# Patient Record
Sex: MALE | Race: WHITE
[De-identification: names, ages, dates, MRNs, and addresses within clinical notes are randomized per-mention and may not be internally consistent; named-entity substitution may affect disease eponyms.]

---

## 2018-01-11 ENCOUNTER — HOSPITAL ENCOUNTER (INPATIENT)
Dept: HOSPITAL 17 - NEPC | Age: 78
LOS: 2 days | Discharge: HOME | DRG: 378 | End: 2018-01-13
Attending: HOSPITALIST | Admitting: HOSPITALIST
Payer: COMMERCIAL

## 2018-01-11 VITALS
RESPIRATION RATE: 18 BRPM | OXYGEN SATURATION: 100 % | DIASTOLIC BLOOD PRESSURE: 69 MMHG | SYSTOLIC BLOOD PRESSURE: 146 MMHG | TEMPERATURE: 97.5 F | HEART RATE: 76 BPM

## 2018-01-11 VITALS
HEART RATE: 102 BPM | SYSTOLIC BLOOD PRESSURE: 160 MMHG | RESPIRATION RATE: 20 BRPM | OXYGEN SATURATION: 100 % | TEMPERATURE: 98 F | DIASTOLIC BLOOD PRESSURE: 64 MMHG

## 2018-01-11 VITALS
OXYGEN SATURATION: 99 % | HEART RATE: 77 BPM | TEMPERATURE: 98 F | RESPIRATION RATE: 20 BRPM | DIASTOLIC BLOOD PRESSURE: 57 MMHG | SYSTOLIC BLOOD PRESSURE: 116 MMHG

## 2018-01-11 VITALS
HEART RATE: 72 BPM | OXYGEN SATURATION: 100 % | DIASTOLIC BLOOD PRESSURE: 65 MMHG | RESPIRATION RATE: 18 BRPM | SYSTOLIC BLOOD PRESSURE: 133 MMHG

## 2018-01-11 VITALS
HEART RATE: 76 BPM | SYSTOLIC BLOOD PRESSURE: 125 MMHG | RESPIRATION RATE: 20 BRPM | DIASTOLIC BLOOD PRESSURE: 60 MMHG | OXYGEN SATURATION: 98 % | TEMPERATURE: 97.7 F

## 2018-01-11 VITALS
RESPIRATION RATE: 20 BRPM | TEMPERATURE: 97.6 F | HEART RATE: 81 BPM | OXYGEN SATURATION: 100 % | SYSTOLIC BLOOD PRESSURE: 143 MMHG | DIASTOLIC BLOOD PRESSURE: 65 MMHG

## 2018-01-11 VITALS — WEIGHT: 134.48 LBS | HEIGHT: 71 IN | BODY MASS INDEX: 18.83 KG/M2

## 2018-01-11 DIAGNOSIS — N17.9: ICD-10-CM

## 2018-01-11 DIAGNOSIS — Z87.891: ICD-10-CM

## 2018-01-11 DIAGNOSIS — R10.13: ICD-10-CM

## 2018-01-11 DIAGNOSIS — I10: ICD-10-CM

## 2018-01-11 DIAGNOSIS — M25.562: ICD-10-CM

## 2018-01-11 DIAGNOSIS — R59.1: ICD-10-CM

## 2018-01-11 DIAGNOSIS — R55: ICD-10-CM

## 2018-01-11 DIAGNOSIS — E86.0: ICD-10-CM

## 2018-01-11 DIAGNOSIS — D62: ICD-10-CM

## 2018-01-11 DIAGNOSIS — R63.4: ICD-10-CM

## 2018-01-11 DIAGNOSIS — Z91.81: ICD-10-CM

## 2018-01-11 DIAGNOSIS — M19.90: ICD-10-CM

## 2018-01-11 DIAGNOSIS — Q43.8: ICD-10-CM

## 2018-01-11 DIAGNOSIS — K25.4: Primary | ICD-10-CM

## 2018-01-11 DIAGNOSIS — E78.5: ICD-10-CM

## 2018-01-11 LAB
% SATURATION IRON PROFILE: 2.4 % (ref 20–50)
ALBUMIN SERPL-MCNC: 3.9 GM/DL (ref 3.4–5)
ALP SERPL-CCNC: 61 U/L (ref 45–117)
ALT SERPL-CCNC: 21 U/L (ref 12–78)
AST SERPL-CCNC: 22 U/L (ref 15–37)
BASOPHILS # BLD AUTO: 0.1 TH/MM3 (ref 0–0.2)
BASOPHILS NFR BLD: 1.4 % (ref 0–2)
BILIRUB INDIRECT SERPL-MCNC: 0.2 MG/DL (ref 0–0.8)
BILIRUB SERPL-MCNC: 0.3 MG/DL (ref 0.2–1)
BUN SERPL-MCNC: 32 MG/DL (ref 7–18)
CALCIUM SERPL-MCNC: 9.1 MG/DL (ref 8.5–10.1)
CHLORIDE SERPL-SCNC: 101 MEQ/L (ref 98–107)
CREAT SERPL-MCNC: 1.27 MG/DL (ref 0.6–1.3)
DIRECT BILIRUBIN ADULT: 0.1 MG/DL (ref 0–0.2)
EOSINOPHIL # BLD: 0 TH/MM3 (ref 0–0.4)
EOSINOPHIL NFR BLD: 0.3 % (ref 0–4)
ERYTHROCYTE [DISTWIDTH] IN BLOOD BY AUTOMATED COUNT: 17.6 % (ref 11.6–17.2)
FERRITIN SERPL-MCNC: 5 NG/ML (ref 26–388)
GFR SERPLBLD BASED ON 1.73 SQ M-ARVRAT: 55 ML/MIN (ref 89–?)
GLUCOSE SERPL-MCNC: 108 MG/DL (ref 74–106)
HCO3 BLD-SCNC: 24.7 MEQ/L (ref 21–32)
HCT VFR BLD CALC: 24.8 % (ref 39–51)
HGB BLD-MCNC: 7.9 GM/DL (ref 13–17)
IRON (FE): 12 MCG/DL (ref 65–175)
LYMPHOCYTES # BLD AUTO: 0.9 TH/MM3 (ref 1–4.8)
LYMPHOCYTES NFR BLD AUTO: 11.4 % (ref 9–44)
MCH RBC QN AUTO: 20 PG (ref 27–34)
MCHC RBC AUTO-ENTMCNC: 31.8 % (ref 32–36)
MCV RBC AUTO: 63 FL (ref 80–100)
MONOCYTE #: 0.7 TH/MM3 (ref 0–0.9)
MONOCYTES NFR BLD: 8.1 % (ref 0–8)
NEUTROPHILS # BLD AUTO: 6.4 TH/MM3 (ref 1.8–7.7)
NEUTROPHILS NFR BLD AUTO: 78.8 % (ref 16–70)
PLATELET # BLD: 366 TH/MM3 (ref 150–450)
PMV BLD AUTO: 7.6 FL (ref 7–11)
PROT SERPL-MCNC: 7.6 GM/DL (ref 6.4–8.2)
RBC # BLD AUTO: 3.94 MIL/MM3 (ref 4.5–5.9)
SODIUM SERPL-SCNC: 135 MEQ/L (ref 136–145)
TIBC SERPL-MCNC: 493 MCG/DL (ref 250–450)
WBC # BLD AUTO: 8.2 TH/MM3 (ref 4–11)

## 2018-01-11 PROCEDURE — 0DJD8ZZ INSPECTION OF LOWER INTESTINAL TRACT, VIA NATURAL OR ARTIFICIAL OPENING ENDOSCOPIC: ICD-10-PCS | Performed by: INTERNAL MEDICINE

## 2018-01-11 PROCEDURE — 88312 SPECIAL STAINS GROUP 1: CPT

## 2018-01-11 PROCEDURE — P9016 RBC LEUKOCYTES REDUCED: HCPCS

## 2018-01-11 PROCEDURE — 72040 X-RAY EXAM NECK SPINE 2-3 VW: CPT

## 2018-01-11 PROCEDURE — 86900 BLOOD TYPING SEROLOGIC ABO: CPT

## 2018-01-11 PROCEDURE — 80048 BASIC METABOLIC PNL TOTAL CA: CPT

## 2018-01-11 PROCEDURE — 86300 IMMUNOASSAY TUMOR CA 15-3: CPT

## 2018-01-11 PROCEDURE — 83550 IRON BINDING TEST: CPT

## 2018-01-11 PROCEDURE — 74176 CT ABD & PELVIS W/O CONTRAST: CPT

## 2018-01-11 PROCEDURE — 86920 COMPATIBILITY TEST SPIN: CPT

## 2018-01-11 PROCEDURE — 82728 ASSAY OF FERRITIN: CPT

## 2018-01-11 PROCEDURE — 86850 RBC ANTIBODY SCREEN: CPT

## 2018-01-11 PROCEDURE — 86901 BLOOD TYPING SEROLOGIC RH(D): CPT

## 2018-01-11 PROCEDURE — 93005 ELECTROCARDIOGRAM TRACING: CPT

## 2018-01-11 PROCEDURE — 30233N1 TRANSFUSION OF NONAUTOLOGOUS RED BLOOD CELLS INTO PERIPHERAL VEIN, PERCUTANEOUS APPROACH: ICD-10-PCS | Performed by: HOSPITALIST

## 2018-01-11 PROCEDURE — 0DB68ZX EXCISION OF STOMACH, VIA NATURAL OR ARTIFICIAL OPENING ENDOSCOPIC, DIAGNOSTIC: ICD-10-PCS | Performed by: INTERNAL MEDICINE

## 2018-01-11 PROCEDURE — C9113 INJ PANTOPRAZOLE SODIUM, VIA: HCPCS

## 2018-01-11 PROCEDURE — 80076 HEPATIC FUNCTION PANEL: CPT

## 2018-01-11 PROCEDURE — 85025 COMPLETE CBC W/AUTO DIFF WBC: CPT

## 2018-01-11 PROCEDURE — 83615 LACTATE (LD) (LDH) ENZYME: CPT

## 2018-01-11 PROCEDURE — 83540 ASSAY OF IRON: CPT

## 2018-01-11 PROCEDURE — 74280 X-RAY XM COLON 2CNTRST STD: CPT

## 2018-01-11 PROCEDURE — 82378 CARCINOEMBRYONIC ANTIGEN: CPT

## 2018-01-11 PROCEDURE — 88305 TISSUE EXAM BY PATHOLOGIST: CPT

## 2018-01-11 PROCEDURE — 82747 ASSAY OF FOLIC ACID RBC: CPT

## 2018-01-11 PROCEDURE — 82607 VITAMIN B-12: CPT

## 2018-01-11 PROCEDURE — 86301 IMMUNOASSAY TUMOR CA 19-9: CPT

## 2018-01-11 PROCEDURE — 36430 TRANSFUSION BLD/BLD COMPNT: CPT

## 2018-01-11 RX ADMIN — PANTOPRAZOLE SODIUM SCH MG: 40 INJECTION, POWDER, FOR SOLUTION INTRAVENOUS at 14:37

## 2018-01-11 RX ADMIN — PHENYTOIN SODIUM SCH MLS/HR: 50 INJECTION INTRAMUSCULAR; INTRAVENOUS at 14:37

## 2018-01-11 RX ADMIN — Medication SCH ML: at 20:10

## 2018-01-11 NOTE — PD
HPI


Chief Complaint:  Syncope/Near-Syncope


Time Seen by Provider:  10:25


Travel History


International Travel<30 days:  No


Contact w/Intl Traveler<30days:  No


Traveled to known affect area:  No





History of Present Illness


HPI


This patient complains of having vertigo this morning.  He had a true room 

spinning sensation and got dizzy and fell.  He did not lose consciousness.  He 

is not having headache today.  He does mention that he is had pain in the back 

of his neck for the last 3 weeks.  No injury to it.  Denies fever.  No other 

neurologic complaint.  He fell he scraped his left knee but is been ambulatory 

since.  Severity is moderate.  No alleviating factors.  No exacerbating 

factors.  Speech slurring or confusion or specific muscle group weakness.





PFSH


Past Medical History


Hx Anticoagulant Therapy:  No


Arthritis:  Yes


Cardiovascular Problems:  No (HTN)


High Cholesterol:  Yes


Chemotherapy:  No


Cerebrovascular Accident:  No


Diabetes:  No


Hypertension:  Yes


Respiratory:  No


Tetanus Vaccination:  < 5 Years





Past Surgical History


Surgical History:  No Previous Surgery





Social History


Alcohol Use:  Yes (OCC)


Tobacco Use:  No


Substance Use:  No





Allergies-Medications


(Allergen,Severity, Reaction):  


Coded Allergies:  


     No Known Allergies (Unverified  Adverse Reaction, Unknown, 1/11/18)


Reported Meds & Prescriptions





Reported Meds & Active Scripts


Active


Amoxil (Amoxicillin) 875 Mg Tab 875 Mg PO BID  10 Days


Medrol Dosepak (Methylprednisolone) 4 Mg Jeff 4 Mg PO AS DIRECTED 


     TAKE AS DIRECTED


Reported


Zestoretic 20/25 (HCTZ/Lisinopril)  Tab 1 Tab PO DAILY 


Naproxen 500 Mg Tab 500 Mg PO BID 


Lovastatin 40 Mg Tab 40 Mg PO HS 








Review of Systems


General / Constitutional:  No: Fever


Eyes:  No: Visual changes


HENT:  Positive: Vertigo, Lightheadedness, Neck Pain, No: Headaches


Cardiovascular:  No: Chest Pain or Discomfort


Respiratory:  No: Shortness of Breath


Gastrointestinal:  No: Abdominal Pain


Genitourinary:  No: Dysuria


Musculoskeletal:  Positive: Arthralgias, No: Pain


Skin:  No Rash


Neurologic:  No: Weakness


Psychiatric:  No: Depression


Endocrine:  No: Polydipsia


Hematologic/Lymphatic:  No: Easy Bruising





Physical Exam


Narrative


GENERAL: Well-nourished, well-developed patient in no apparent distress.


SKIN: Focused skin assessment reveals no rash and nodules. Skin is Warm and dry.


HEAD: Atraumatic. Normocephalic. 


EYES: Pupils equal and round. No scleral icterus. No injection or drainage. 


ENT: No nasal bleeding or discharge.  Mucous membranes pink and moist.


NECK: Trachea midline. No JVD.  No midline tenderness.  No bruising or swelling.


CARDIOVASCULAR: Regular rate and rhythm.  No murmur appreciated.


RESPIRATORY: No accessory muscle use. Clear to auscultation. Breath sounds 

equal bilaterally. 


GASTROINTESTINAL: Abdomen soft, non-tender, nondistended. Hepatic and splenic 

margins not palpable. 


MUSCULOSKELETAL: No obvious deformities. No clubbing.  No cyanosis.  No edema.  

Some abrasion to the left knee without tenderness


NEUROLOGICAL: Awake and alert. No obvious cranial nerve deficits.  Motor 

grossly within normal limits. Normal speech.


PSYCHIATRIC: Appropriate mood and affect; insight and judgment normal.





Data


Data


Last Documented VS





Vital Signs








  Date Time  Temp Pulse Resp B/P (MAP) Pulse Ox O2 Delivery O2 Flow Rate FiO2


 


1/11/18 09:50 98.0 102 20 160/64 (96) 100 Room Air  








Orders





 Orders


Iv Access Insert/Monitor (1/11/18 10:32)


Complete Blood Count With Diff (1/11/18 10:32)


Basic Metabolic Panel (Bmp) (1/11/18 10:32)


Spine, Cervical - Ltd (Ap&Lat) (1/11/18 )


Electrocardiogram (1/11/18 )


Meclizine (Antivert) (1/11/18 10:45)


Ondansetron  Odt (Zofran  Odt) (1/11/18 10:45)


Admit Order (Ed Use Only) (1/11/18 13:19)





Labs





Laboratory Tests








Test


  1/11/18


10:55


 


White Blood Count 8.2 TH/MM3 


 


Red Blood Count 3.94 MIL/MM3 


 


Hemoglobin 7.9 GM/DL 


 


Hematocrit 24.8 % 


 


Mean Corpuscular Volume 63.0 FL 


 


Mean Corpuscular Hemoglobin 20.0 PG 


 


Mean Corpuscular Hemoglobin


Concent 31.8 % 


 


 


Red Cell Distribution Width 17.6 % 


 


Platelet Count 366 TH/MM3 


 


Mean Platelet Volume 7.6 FL 


 


Neutrophils (%) (Auto) 78.8 % 


 


Lymphocytes (%) (Auto) 11.4 % 


 


Monocytes (%) (Auto) 8.1 % 


 


Eosinophils (%) (Auto) 0.3 % 


 


Basophils (%) (Auto) 1.4 % 


 


Neutrophils # (Auto) 6.4 TH/MM3 


 


Lymphocytes # (Auto) 0.9 TH/MM3 


 


Monocytes # (Auto) 0.7 TH/MM3 


 


Eosinophils # (Auto) 0.0 TH/MM3 


 


Basophils # (Auto) 0.1 TH/MM3 


 


CBC Comment DIFF FINAL 


 


Differential Comment  


 


Blood Urea Nitrogen 32 MG/DL 


 


Creatinine 1.27 MG/DL 


 


Random Glucose 108 MG/DL 


 


Calcium Level 9.1 MG/DL 


 


Sodium Level 135 MEQ/L 


 


Potassium Level 4.1 MEQ/L 


 


Chloride Level 101 MEQ/L 


 


Carbon Dioxide Level 24.7 MEQ/L 


 


Anion Gap 9 MEQ/L 


 


Estimat Glomerular Filtration


Rate 55 ML/MIN 


 











MDM


Medical Decision Making


Medical Screen Exam Complete:  Yes


Emergency Medical Condition:  Yes


Medical Record Reviewed:  Yes


Differential Diagnosis


Vertigo, cardiac arrhythmia, flu syndrome


Narrative Course


I have reviewed the patient's electronic medical record.  Last known hemoglobin 

was 15





No real objective findings on exam.  He is neurologically intact.


I gave him a dose of meclizine and Zofran


IV placed


CBC shows hemoglobin of 7.9


Metabolic profile reasonably normal other than elevated BUN is expected


I reviewed his EKG which shows sinus rhythm and no ST elevation or ectopy


I reviewed her cervical spine x-rays which shows minor degenerative change


He has no meningeal signs





This patient had a presyncopal episode as well as GI bleed with anemia.


I discussed with the hospitalist will admit


Since this hospital is full administration is asking that we admit them to the 

[facility] which I have done


The hospitalist here is doing history and physical and writing admission orders





Diagnosis





 Primary Impression:  


 GI bleed


 Qualified Codes:  K92.2 - Gastrointestinal hemorrhage, unspecified


 Additional Impressions:  


 Anemia


 Qualified Codes:  D64.9 - Anemia, unspecified


 Pre-syncope





Admitting Information


Admitting Physician Requests:  Admit











Reagan Hurd MD Jan 11, 2018 10:54

## 2018-01-11 NOTE — HHI.HP
__________________________________________________





[facility]


Service


[facility]


Primary Care Physician


Erin Weinstein MD


Admission Diagnosis





Diagnoses:  


Chief Complaint:  


syncope, black stools


Travel History


International Travel<30 Days:  No


Contact w/Intl Traveler <30 Da:  No


Traveled to Known Affected Are:  No


History of Present Illness


Written by Paula Ospina, acting as scribe for Dr. Ellington on 18 at 13:

19. 





77-year-old male with hypertension, hyperlipidemia, arthritis, presents after a 

syncopal episode with black stools. The patient reports earlier today while he 

was at work, he became dizzy with blurred vision, fell to the floor, attempted 

to ambulate again then passed out. Denies hitting his head. He presented to the 

ED with left knee pain. Upon arrival to the ED, labs remarkable for Hgb 7.9. He 

denies any history of anemia. He admits to noticing black stools for a few 

weeks now. Denies hematochezia, hematuria, or hematemesis. He denies any fever/

chills, abdominal pain, nausea, vomiting, or diarrhea. He believes he had an 

unremarkable colonoscopy 3-5years ago by colorectal surgeon Dr. Camara. He's 

never had an EGD. He admits to occasional shortness of breath, worse with 

exertion recently. Denies any chest pain. He's felt more fatigued over the past 

few weeks. His daughter reports he's lost "a lot" of weight over the past 

6months but unable to quantify. He states his appetite is good and denies any 

recent changes in eating habits. He does state he had some vague abdominal pain 

3 months ago, saw his PCP, and he was started on omeprazole at that time. He 

denies taking any NSAIDs. He follows with PCP Dr. Weinstein only. He does not follow 

with a gastroenterologist.





Review of Systems


Except as stated in HPI:  all other systems reviewed are Neg





Past Family Social History


Past Medical History


Hypertension


Hyperlipidemia


Past Surgical History


Colonoscopy


Reported Medications





Zestoretic 20/25 (HCTZ/Lisinopril)  Tab 1 Tab PO DAILY 


Lovastatin 40 Mg Tab 40 Mg PO HS 


Omeprazole 20mg daily


Latanoprost opth eye drops 0.005% 2.5ML


Allergies:  


Coded Allergies:  


     No Known Allergies (Unverified  Allergy, Unknown, 18)


Active Ordered Medications





Current Medications








 Medications


  (Trade)  Dose


 Ordered  Sig/Dina


 Route  Start Time


 Stop Time Status Last Admin


 


 Sodium Chloride  250 ml @ 


 15 mls/hr  ONCE  ONCE


 IV  18 13:45


 18 06:24 UNV  


 


 


  (Lasix Inj)  20 mg  ONCE  ONCE


 IV PUSH  18 13:45


 18 13:46 UNV  


 


 


  (Vasotec Inj)  1.25 mg  Q8H  PRN


 IV PUSH  18 13:45


   UNV  


 


 


 Sodium Chloride  1,000 ml @ 


 75 mls/hr  [id number]


 IV  18 13:33


   UNV  


 


 


  (NS Flush)  2 ml  UNSCH  PRN


 IV FLUSH  18 13:45


   UNV  


 


 


  (NS Flush)  2 ml  BID


 IV FLUSH  18 21:00


   UNV  


 


 


  (Zofran Inj)  4 mg  Q6H  PRN


 IVP  18 13:45


   UNV  


 


 


  (Tylenol)  650 mg  Q6H  PRN


 PO  18 13:45


   UNV  


 


 


  (Norco  5-325 Mg)  1 tab  Q4H  PRN


 PO  18 13:45


   UNV  


 


 


  (Morphine Inj)  2 mg  Q4H  PRN


 IV PUSH  18 13:45


   UNV  


 


 


  (Narcan Inj)  0.4 mg  UNSCH  PRN


 IV PUSH  18 13:45


   UNV  


 


 


  (Milk Of


 Magnesia Liq)  30 ml  Q12H  PRN


 PO  18 13:45


   UNV  


 


 


  (Senokot)  17.2 mg  Q12H  PRN


 PO  18 13:45


   UNV  


 


 


  (Dulcolax Supp)  10 mg  DAILY  PRN


 RECTAL  18 13:45


   UNV  


 


 


  (Protonix Inj)  40 mg  Q12H


 IV PUSH  18 13:45


   UNV  


 








Family History


Mother  with heart disease age 89


Father  age 96 with heart attack


Social History


Smoked tobacco for 5 years, quit 45 years ago


Very rare alcohol use


Denies any illicit drug use





Physical Exam


Vital Signs





Vital Signs








  Date Time  Temp Pulse Resp B/P (MAP) Pulse Ox O2 Delivery O2 Flow Rate FiO2


 


18 09:50 98.0 102 20 160/64 (96) 100 Room Air  








Physical Exam


GENERAL: Well-developed well-nourished pleasant elderly male patient in [facility]. 


SKIN: Warm and dry. Pale. 


HEAD: Atraumatic. Normocephalic. 


EYES: Pupils equal and round. Subconjunctival pallor. No scleral icterus.  


ENT: No nasal bleeding or discharge.  Mucous membranes pink and moist.


NECK: Trachea midline.  


CARDIOVASCULAR: Regular rate and rhythm.  No murmur appreciated. 


RESPIRATORY: No accessory muscle use. Clear to auscultation. Breath sounds 

equal bilaterally. 


GASTROINTESTINAL: Abdomen soft, non-tender, nondistended. Hepatic and splenic 

margins not palpable. 


MUSCULOSKELETAL: Extremities without clubbing, cyanosis, or edema. No obvious 

deformities. 


NEUROLOGICAL: Awake and alert. No obvious cranial nerve deficits.  Motor 

grossly within normal limits. Moving all extremities spontaneously.  Normal 

speech.


PSYCHIATRIC: Appropriate mood and affect; insight and judgment normal.


Laboratory





Laboratory Tests








Test


  18


10:55


 


White Blood Count 8.2 


 


Red Blood Count 3.94 


 


Hemoglobin 7.9 


 


Hematocrit 24.8 


 


Mean Corpuscular Volume 63.0 


 


Mean Corpuscular Hemoglobin 20.0 


 


Mean Corpuscular Hemoglobin


Concent 31.8 


 


 


Red Cell Distribution Width 17.6 


 


Platelet Count 366 


 


Mean Platelet Volume 7.6 


 


Neutrophils (%) (Auto) 78.8 


 


Lymphocytes (%) (Auto) 11.4 


 


Monocytes (%) (Auto) 8.1 


 


Eosinophils (%) (Auto) 0.3 


 


Basophils (%) (Auto) 1.4 


 


Neutrophils # (Auto) 6.4 


 


Lymphocytes # (Auto) 0.9 


 


Monocytes # (Auto) 0.7 


 


Eosinophils # (Auto) 0.0 


 


Basophils # (Auto) 0.1 


 


CBC Comment DIFF FINAL 


 


Differential Comment  


 


Blood Urea Nitrogen 32 


 


Creatinine 1.27 


 


Random Glucose 108 


 


Calcium Level 9.1 


 


Sodium Level 135 


 


Potassium Level 4.1 


 


Chloride Level 101 


 


Carbon Dioxide Level 24.7 


 


Anion Gap 9 


 


Estimat Glomerular Filtration


Rate 55 


 








Result Diagram:  


18 1055                                                                   

             18 1055





Imaging





Last Impressions








Cervical Spine X-Ray 18 0000 Signed





Impressions: 





 Service Date/Time:   10:41 - CONCLUSION:  1. No 

acute 





 fracture or malalignment. 2. Degenerative disc change at C5-6 and C6-7.     





 Michael R. Schiering, MD Caprini VTE Risk Assessment


Caprini VTE Risk Assessment:  Mod/High Risk (score >= 2)


VTE Pharm Contraindication:  Active bleeding


Caprini Risk Assessment Model











 Point Value = 1          Point Value = 2  Point Value = 3  Point Value = 5


 


Age 41-60


Minor surgery


BMI > 25 kg/m2


Swollen legs


Varicose veins


Pregnancy or postpartum


History of unexplained or recurrent


   spontaneous 


Oral contraceptives or hormone


   replacement


Sepsis (< 1 month)


Serious lung disease, including


   pneumonia (< 1 month)


Abnormal pulmonary function


Acute myocardial infarction


Congestive heart failure (< 1 month)


History of inflammatory bowel disease


Medical patient at bed rest Age 61-74


Arthroscopic surgery


Major open surgery (> 45 min)


Laparoscopic surgery (> 45 min)


Malignancy


Confined to bed (> 72 hours)


Immobilizing plaster cast


Central venous access Age >= 75


History of VTE


Family history of VTE


Factor V Leiden


Prothrombin 42274O


Lupus anticoagulant


Anticardiolipin antibodies


Elevated serum homocysteine


Heparin-induced thrombocytopenia


Other congenital or acquired


   thrombophilia Stroke (< 1 month)


Elective arthroplasty


Hip, pelvis, or leg fracture


Acute spinal cord injury (< 1 month)








Prophylaxis Regimen











   Total Risk


Factor Score Risk Level Prophylaxis Regimen


 


0-1      Low Early ambulation


 


2 Moderate Order ONE of the following:


*Sequential Compression Device (SCD)


*Heparin 5000 units SQ BID


 


3-4 Higher Order ONE of the following medications:


*Heparin 5000 units SQ TID


*Enoxaparin/Lovenox 40 mg SQ daily (WT < 150 kg, CrCl > 30 mL/min)


*Enoxaparin/Lovenox 30 mg SQ daily (WT < 150 kg, CrCl > 10-29 mL/min)


*Enoxaparin/Lovenox 30 mg SQ BID (WT < 150 kg, CrCl > 30 mL/min)


AND/OR


*Sequential Compression Device (SCD)


 


5 or more Highest Order ONE of the following medications:


*Heparin 5000 units SQ TID (Preferred with Epidurals)


*Enoxaparin/Lovenox 40 mg SQ daily (WT < 150 kg, CrCl > 30 mL/min)


*Enoxaparin/Lovenox 30 mg SQ daily (WT < 150 kg, CrCl > 10-29 mL/min)


*Enoxaparin/Lovenox 30 mg SQ BID (WT < 150 kg, CrCl > 30 mL/min)


AND


*Sequential Compression Device (SCD)











Assessment and Plan


Assessment and Plan


77-year-old male with hypertension, hyperlipidemia, arthritis, presents after a 

syncopal episode with black stools. 





Symptomatic Anemia suspect secondary to GI Bleed: Hgb 7.9, previously 15.9 in 

. Rectal exam done by ER physician positive for melena, Hemoccult+. 


   -transfuse 2u pRBCs now


   -monitor serial H&H


   -keep NPO for now


   -start on IV Protonix 40mg q12h


   -supportive treatment with IVF hydration, antiemetics prn, pain control prn


   -consult gastroenterology





Syncope: suspect secondary to anemia as above in combination with mild 

dehydration.  


   -giving blood transfusion x2u


   -continue IVF hydration


   -monitor for improvement





Dehydration, Mild SAUMYA: Cr 1.27, previously 0.93 in . 


   -give IVF hydration at 75cc/hr


   -repeat BMP in am





Hypertension: chronic, stable


   -hold patient lisinopril and hctz for now


   -monitor BP, add antihypertensives as needed





DVT Prophylaxis: teds/SCDs; avoid chemoprophylaxis with GI bleed as above








the above note was scribed by Ms. Paula Ospina ( PA).  I attest that I had a 

face-to-face encounter with the patient  and personally performed the physical 

exam, medical decision making and reviewed the findings and plan with the 

patient.











Paula Ospina PA-C 2018 13:23


Monica Ellington MD 2018 14:24

## 2018-01-11 NOTE — PD.CONS
HPI


History of Present Illness


This is a 77 year old M who presented to the emergency department today after 

having a syncopal episode at 4:30am this morning.  Denies chest pain of SOB 

prior to episode.  Pt reports black, tarry stools for the past month, has been 

having about one BM a day, but today reports having multiple.  Also complaining 

of intermittent epigastric and upper abdominal pain for the past month. Was 

recently started on Omeprazole by his PCP with mild relief in pain.  Denies 

nausea, vomiting.  Does report an unintentional weight loss of approx 20 pounds 

over the past month.  Denies NSAIDs, blood thinners, ETOH, smoking.  Has never 

had an EGD. Last colonoscopy was 3-5 years ago by Dr. Camara and pt reports 

normal exam.  Currently H/H are 7.9/24.8.  Pt is being transferred to [facility] in [place] due to no beds available at this facility.  Pt to 

be followed by our service, Dr. Zeng.  


 (Leslie Santiago)





PFSH


Past Medical History


Hypertension


Hyperlipidemia


Past Surgical History


Colonoscopy


 (Leslie Santiago)


Coded Allergies:  


     No Known Allergies (Unverified  Allergy, Unknown, 18)


Family History


Mother  with heart disease age 89


Father  age 96 with heart attack


Social History


Smoked tobacco for 5 years, quit 45 years ago


Denies ETOH


Denies any illicit drug use


 (Leslie Santiago)





Review of Systems


Constitutional:  COMPLAINS OF: Dizziness


Respiratory:  DENIES: Shortness of breath


Cardiovascular:  DENIES: Chest pain, Palpitations


Gastrointestinal:  COMPLAINS OF: Abdominal pain, Black stools, DENIES: Bloody 

stools, Constipation, Diarrhea, Nausea, Vomiting, Odynophagia, Heartburn, 

Hematemesis (Leslie Santiago)





GI Exam


Vitals I&O





Vital Signs








  Date Time  Temp Pulse Resp B/P (MAP) Pulse Ox O2 Delivery O2 Flow Rate FiO2


 


18 14:45  72 18 133/65 (87) 100 Room Air  


 


18 09:50 98.0 102 20 160/64 (96) 100 Room Air  








Imaging





Last Impressions








Cervical Spine X-Ray 18 0000 Signed





Impressions: 





 Service Date/Time:   10:41 - CONCLUSION:  1. No 

acute 





 fracture or malalignment. 2. Degenerative disc change at C5-6 and C6-7.     





 Kenny Chavez MD 








Laboratory











Test


  18


10:55


 


White Blood Count 8.2 TH/MM3 


 


Red Blood Count 3.94 MIL/MM3 


 


Hemoglobin 7.9 GM/DL 


 


Hematocrit 24.8 % 


 


Mean Corpuscular Volume 63.0 FL 


 


Mean Corpuscular Hemoglobin 20.0 PG 


 


Mean Corpuscular Hemoglobin


Concent 31.8 % 


 


 


Red Cell Distribution Width 17.6 % 


 


Platelet Count 366 TH/MM3 


 


Mean Platelet Volume 7.6 FL 


 


Neutrophils (%) (Auto) 78.8 % 


 


Lymphocytes (%) (Auto) 11.4 % 


 


Monocytes (%) (Auto) 8.1 % 


 


Eosinophils (%) (Auto) 0.3 % 


 


Basophils (%) (Auto) 1.4 % 


 


Neutrophils # (Auto) 6.4 TH/MM3 


 


Lymphocytes # (Auto) 0.9 TH/MM3 


 


Monocytes # (Auto) 0.7 TH/MM3 


 


Eosinophils # (Auto) 0.0 TH/MM3 


 


Basophils # (Auto) 0.1 TH/MM3 


 


CBC Comment DIFF FINAL 


 


Differential Comment  


 


Blood Urea Nitrogen 32 MG/DL 


 


Creatinine 1.27 MG/DL 


 


Random Glucose 108 MG/DL 


 


Calcium Level 9.1 MG/DL 


 


Sodium Level 135 MEQ/L 


 


Potassium Level 4.1 MEQ/L 


 


Chloride Level 101 MEQ/L 


 


Carbon Dioxide Level 24.7 MEQ/L 


 


Anion Gap 9 MEQ/L 


 


Estimat Glomerular Filtration


Rate 55 ML/MIN 


 








Physical Examination


HEENT: Normocephalic; atraumatic


CHEST:  Even/unlabored


CARDIAC:  RRR


ABDOMEN:  Soft, nondistended, nontender; no hepatosplenomegaly; bowel sounds 

active


EXTREMITIES: No clubbing, cyanosis, or edema. 


SKIN:  No rash; no jaundice. Abrasion to left knee and left elbow. 


CNS:  No focal deficits; alert and oriented times three.


 (Leslie Santiago)





Assessment and Plan


Plan


Assessment


- Melena- Complaining of black, tarry stools x 1 month, increasing in frequency 

today.  Presented


  after syncopal episode.  H/H currently 7.9/24.8.  Denies NSAIDs, blood 

thinners, ETOH, smoking.


  Has never had EGD.  Plan for EGD tomorrow.  Pt has 2 U PRBC ordered per 

attending.  


- Weight loss- 20 pound weight loss over the past month.  Last colonoscopy was 

approx 3-5 years


  ago, reports normal exam.  Will plan for CT abdomen and pelvis today and 

colonoscopy tomorrow.





Plan:


- EGD/colonoscopy tomorrow


- Obtain consents


- Clear liquids today


- NPO after MN


- GoLytely prep


- CT abdomen and pelvis W/O IV contrast (due to GFR 55) 


- Monitor H/H


- Transfuse as needed


- Protonix


- Supportive care


- Further recommendations to follow based on results of above 


 (Leslie Santiago)


Physician Comments


seen, examined


agree with above 


 (Dominique Brar MD)











Leslie Santiago 2018 15:36


Dominique Brar MD 2018 16:44

## 2018-01-11 NOTE — RADRPT
EXAM DATE/TIME:  01/11/2018 10:41 

 

[facility] COMPARISON:     

No previous studies available for comparison.

 

                     

INDICATIONS :     

Dizziness. Posterior neck stiffness.

                     

 

MEDICAL HISTORY :     

None.          

 

SURGICAL HISTORY :     

None.   

 

ENCOUNTER:     

Initial                                        

 

ACUITY:     

1 day      

 

PAIN SCORE:     

2/10

 

LOCATION:      

posterior c-spine

 

FINDINGS:     

AP and lateral views of the cervical spine were obtained as well as odontoid views. This demonstrates
 mild osteopenia normal alignment. Degenerative disc changes are present at the C5-6 and C6-7 levels 
with mild disc space narrowing and hypertrophic change. The prevertebral soft tissues are within norm
al limits. The dens is intact.

 

CONCLUSION:     

1. No acute fracture or malalignment.

2. Degenerative disc change at C5-6 and C6-7.

 

 

 

 Kenny Chavez MD on January 11, 2018 at 11:05           

Board Certified Radiologist.

 This report was verified electronically.

## 2018-01-12 VITALS
TEMPERATURE: 98 F | RESPIRATION RATE: 20 BRPM | SYSTOLIC BLOOD PRESSURE: 140 MMHG | OXYGEN SATURATION: 96 % | DIASTOLIC BLOOD PRESSURE: 8 MMHG | HEART RATE: 80 BPM

## 2018-01-12 VITALS
OXYGEN SATURATION: 97 % | DIASTOLIC BLOOD PRESSURE: 61 MMHG | TEMPERATURE: 98.5 F | SYSTOLIC BLOOD PRESSURE: 133 MMHG | RESPIRATION RATE: 20 BRPM | HEART RATE: 84 BPM

## 2018-01-12 VITALS
RESPIRATION RATE: 20 BRPM | HEART RATE: 78 BPM | SYSTOLIC BLOOD PRESSURE: 151 MMHG | TEMPERATURE: 98.6 F | OXYGEN SATURATION: 99 % | DIASTOLIC BLOOD PRESSURE: 88 MMHG

## 2018-01-12 VITALS
TEMPERATURE: 97 F | HEART RATE: 63 BPM | SYSTOLIC BLOOD PRESSURE: 133 MMHG | OXYGEN SATURATION: 100 % | RESPIRATION RATE: 18 BRPM | DIASTOLIC BLOOD PRESSURE: 69 MMHG

## 2018-01-12 VITALS
OXYGEN SATURATION: 98 % | RESPIRATION RATE: 18 BRPM | TEMPERATURE: 97.8 F | SYSTOLIC BLOOD PRESSURE: 135 MMHG | HEART RATE: 78 BPM | DIASTOLIC BLOOD PRESSURE: 67 MMHG

## 2018-01-12 VITALS
DIASTOLIC BLOOD PRESSURE: 66 MMHG | HEART RATE: 74 BPM | SYSTOLIC BLOOD PRESSURE: 132 MMHG | RESPIRATION RATE: 20 BRPM | TEMPERATURE: 97.1 F | OXYGEN SATURATION: 99 %

## 2018-01-12 LAB
BASOPHILS # BLD AUTO: 0 TH/MM3 (ref 0–0.2)
BASOPHILS NFR BLD: 0.9 % (ref 0–2)
BUN SERPL-MCNC: 22 MG/DL (ref 7–18)
CALCIUM SERPL-MCNC: 8.5 MG/DL (ref 8.5–10.1)
CANCER AG15-3 SERPL-ACNC: 18.2 U/ML (ref 0–32.4)
CANCER AG19-9 SERPL-ACNC: 3.8 U/ML (ref 0–35)
CEA SERPL-MCNC: 0.7 NG/ML (ref 0.2–5)
CHLORIDE SERPL-SCNC: 104 MEQ/L (ref 98–107)
CREAT SERPL-MCNC: 1.1 MG/DL (ref 0.6–1.3)
EOSINOPHIL # BLD: 0.1 TH/MM3 (ref 0–0.4)
EOSINOPHIL NFR BLD: 2 % (ref 0–4)
ERYTHROCYTE [DISTWIDTH] IN BLOOD BY AUTOMATED COUNT: 22.2 % (ref 11.6–17.2)
GFR SERPLBLD BASED ON 1.73 SQ M-ARVRAT: 65 ML/MIN (ref 89–?)
GLUCOSE SERPL-MCNC: 78 MG/DL (ref 74–106)
HCO3 BLD-SCNC: 29.8 MEQ/L (ref 21–32)
HCT VFR BLD CALC: 31.1 % (ref 39–51)
HGB BLD-MCNC: 9.5 GM/DL (ref 13–17)
LYMPHOCYTES # BLD AUTO: 0.9 TH/MM3 (ref 1–4.8)
LYMPHOCYTES NFR BLD AUTO: 17.2 % (ref 9–44)
MCH RBC QN AUTO: 21.1 PG (ref 27–34)
MCHC RBC AUTO-ENTMCNC: 30.4 % (ref 32–36)
MCV RBC AUTO: 69.3 FL (ref 80–100)
MONOCYTE #: 0.6 TH/MM3 (ref 0–0.9)
MONOCYTES NFR BLD: 11.2 % (ref 0–8)
NEUTROPHILS # BLD AUTO: 3.4 TH/MM3 (ref 1.8–7.7)
NEUTROPHILS NFR BLD AUTO: 68.7 % (ref 16–70)
OVALOCYTES BLD QL SMEAR: (no result)
PLATELET # BLD: 263 TH/MM3 (ref 150–450)
PMV BLD AUTO: 7.5 FL (ref 7–11)
RBC # BLD AUTO: 4.5 MIL/MM3 (ref 4.5–5.9)
SODIUM SERPL-SCNC: 140 MEQ/L (ref 136–145)
WBC # BLD AUTO: 5 TH/MM3 (ref 4–11)

## 2018-01-12 RX ADMIN — PANTOPRAZOLE SODIUM SCH MG: 40 INJECTION, POWDER, FOR SOLUTION INTRAVENOUS at 12:41

## 2018-01-12 RX ADMIN — Medication SCH ML: at 20:00

## 2018-01-12 RX ADMIN — Medication SCH ML: at 11:10

## 2018-01-12 RX ADMIN — PHENYTOIN SODIUM SCH MLS/HR: 50 INJECTION INTRAMUSCULAR; INTRAVENOUS at 16:13

## 2018-01-12 RX ADMIN — PANTOPRAZOLE SODIUM SCH MG: 40 INJECTION, POWDER, FOR SOLUTION INTRAVENOUS at 02:47

## 2018-01-12 RX ADMIN — PHENYTOIN SODIUM SCH MLS/HR: 50 INJECTION INTRAMUSCULAR; INTRAVENOUS at 11:10

## 2018-01-12 NOTE — PD.ONC.PN
Subjective


Subjective


Remarks


Mr. Dillard was seen and examined, vital signs, labs, medications were reviewed, 

EGD results were reviewed, CT images were reviewed.


Subjectively he reports loss of appetite, epigastric abdominal pain, night 

sweats and a proximally 40 pound weight loss over the past 2 months.


Mr. Dillard reports being in his usual fair state of health up until about a week 

ago when he began to feel lightheaded while lifting. He passed out and fell on 

the concrete injuring his head and scraping his knees.  He came to 

spontaneously but after this felt too weak to return to work. He presented to 

the emergency department where he was noted to have severe microcytic anemia.  

Stool for occult blood was positive.  CT abdomen and pelvis revealed antral 

thickening with enlarged lymph nodes adjacent to the gastric antrum.


EGD was performed on 2018 and a large ulcerated mass was identified within 

the gastric antrum. Biopsies were obtained.


I was asked to meet with the patient and his family to discuss the results of 

the EGD and to discuss possible additional workup and management.





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 

work for the same hospital.





Past medical history:  Hypertension next hyperlipidemia.





Past surgical history: Patient denies ever having had any surgeries other then 

the EGD performed today and a previous colonoscopy in 2015.





Family history: Father and brothers with heart disease. Parents are both 

, no oncologic diagnoses noted in the family.





Social history: Patient lives at home with his wife, he has 2 adult children. 

The patient reports originally being from [place] but he lived and worked 

most of his life in [facility]. He reports being a former smoker having 

smoked a total of about 5 years but he quit 45 years ago. He denies alcohol 

consumption or abuse.


Allergies no known drug allergies.





Objective


Data











  Date Time  Temp Pulse Resp B/P (MAP) Pulse Ox O2 Delivery O2 Flow Rate FiO2


 


18 12:00 97.8 78 18 135/67 (89) 98   


 


18 12:00 99.1 74 18 142/67 (92) 98   


 


18 08:40 97.0 63 18 133/69 (90) 100   


 


18 08:23  59 16 118/58 (78) 100   


 


18 08:15  64 16 91/57 (68) 100   


 


18 07:53 97.9 68 16 103/56 (72) 100   


 


18 06:44 98.6 78 20 151/88 (109) 99   


 


18 00:00 97.1 74 20 132/66 99   


 


18 23:45 97.7 76 20 125/60 98   


 


18 21:03 98.0 77 20 116/57 99   


 


18 20:48 97.6 81 20 143/65 100   














 18





 07:00 15:00 23:00


 


Intake Total 800 ml 1500 ml 500 ml


 


Balance 800 ml 1500 ml 500 ml








Result Diagram:  


18 0544                                                                   

             18 0544





Laboratory Results





Laboratory Tests








Test


  18


05:44 18


10:04


 


White Blood Count 5.0 TH/MM3  


 


Red Blood Count 4.50 MIL/MM3  


 


Hemoglobin 9.5 GM/DL  


 


Hematocrit 31.1 %  


 


Mean Corpuscular Volume 69.3 FL  


 


Mean Corpuscular Hemoglobin 21.1 PG  


 


Mean Corpuscular Hemoglobin


Concent 30.4 % 


  


 


 


Red Cell Distribution Width 22.2 %  


 


Platelet Count 263 TH/MM3  


 


Mean Platelet Volume 7.5 FL  


 


Neutrophils (%) (Auto) 68.7 %  


 


Lymphocytes (%) (Auto) 17.2 %  


 


Monocytes (%) (Auto) 11.2 %  


 


Eosinophils (%) (Auto) 2.0 %  


 


Basophils (%) (Auto) 0.9 %  


 


Neutrophils # (Auto) 3.4 TH/MM3  


 


Lymphocytes # (Auto) 0.9 TH/MM3  


 


Monocytes # (Auto) 0.6 TH/MM3  


 


Eosinophils # (Auto) 0.1 TH/MM3  


 


Basophils # (Auto) 0.0 TH/MM3  


 


CBC Comment AUTO DIFF  


 


Differential Comment


  AUTO DIFF


CONFIRMED 


 


 


Ovalocytes 1+  


 


Blood Urea Nitrogen 22 MG/DL  


 


Creatinine 1.10 MG/DL  


 


Random Glucose 78 MG/DL  


 


Calcium Level 8.5 MG/DL  


 


Sodium Level 140 MEQ/L  


 


Potassium Level 4.0 MEQ/L  


 


Chloride Level 104 MEQ/L  


 


Carbon Dioxide Level 29.8 MEQ/L  


 


Anion Gap 6 MEQ/L  


 


Estimat Glomerular Filtration


Rate 65 ML/MIN 


  


 


 


Carcinoembryonic Antigen  0.7 NG/ML 


 


CA 15-3 Antigen  18.2 U/ML 


 


CA 19-9 Antigen  3.8 U/ML 








Imaging Studies





Last 24 hours Impressions








Barium Enema w/Air Contrast 1/12/18 0000 Signed





Impressions: 





 Service Date/Time:  2018 13:36 - CONCLUSION: Negative air 





 contrast barium enema with scattered debris.     Tejas Abreu MD 


 


Abdomen/Pelvis CT 18 0000 Signed





Impressions: 





 Service Date/Time:  2018 13:24 - CONCLUSION:  Probable 

wall 





 thickening involving the antrum of the stomach and adjacent lobular soft 

tissue 





 density masses which may be enlarged lymph nodes or other peritoneal masses. 





 Small volume of free pelvic fluid. CT of the abdomen with IV contrast would be 





 suggested for further evaluation if feasible.     Tejas Trejo MD 











Administered Medications








 Medications


  (Trade)  Dose


 Ordered  Sig/Dina


 Route


 PRN Reason  Start Time


 Stop Time Status Last Admin


Dose Admin


 


 Sodium Chloride  1,000 ml @ 


 75 mls/hr  [id number]


 IV


   18 13:33


    18 16:13


 


 


 Sodium Chloride


  (NS Flush)  2 ml  BID


 IV FLUSH


   18 21:00


    18 11:10


 


 


 Pantoprazole


 Sodium


  (Protonix Inj)  40 mg  Q12H


 IV PUSH


   18 14:00


    18 12:41


 








Objective Remarks


GENERAL: Elderly male, sitting up in bed, he appears to be in no acute distress 

has a pleasant disposition, he appears to be thin but not cachectic.


SKIN: Warm and dry.


HEAD: Normocephalic.


EYES: No scleral icterus. No injection or drainage. 


NECK: Supple, trachea midline. No JVD or lymphadenopathy.


LYMPHATIC: No adenopathy.


CARDIOVASCULAR: Regular rate and rhythm without murmurs. 


RESPIRATORY: Breath sounds equal bilaterally. No accessory muscle use.


GASTROINTESTINAL: Abdomen soft, non-tender, nondistended. Thin abdomen, no 

organ enlargement noted.


EXTREMITIES: No cyanosis, or edema. 


MUSCULOSKELETAL: Generally decreased muscle mass, adequate tone and strength.


NEUROLOGICAL: No obvious focal deficit. Awake, alert, and oriented x3.


PSYCHIATRIC: Appropriate mood and affect; insight and judgment normal.





Assessment/Plan


Assessment


Mr. Dillard is a 77 male who was well up until about a week ago, he had a 

syncopal episode while at work, he presented to the hospital for further workup 

and evaluation. When asked he reports a history of unintended weight loss of 

the past 3 months during which time he lost about 40 pounds. The patient was 

found to have microcytic anemia on blood work and stool positive for occult 

blood. CT imaging of the abdomen and pelvis indicates thickening of the antral 

wall and lymphadenopathy adjacent to the gastric antrum. EGD revealed a large 

ulcer in the gastric antrum which has been biopsied; EGD performed on 2018.


Plan


1.  Gastric antral ulcer: Await pathology. Differential diagnoses include 

primary gastric malignancy versus lymphoid malignancy such as diffuse large B-

cell lymphoma.


I did talk to the patient, his wife and his children about the staging workup 

which will be required.


The patient has requested to follow up with me at my [place] office because of 

our acquaintance through through our common employer.





Disposition: He may be discharged home when he is able to tolerate by mouth 

intake.


I would recommend oral iron replacement therapy for management of symptomatic 

anemia.


LDH will be ordered because lymphoma is in the differential diagnosis.











Joaquin Dumont MD 2018 18:56

## 2018-01-12 NOTE — PD.PROCEDR
GI Procedure








PROCEDURE PERFORMED


EGD with biopsy, colonoscopy





INDICATION FOR PROCEDURE


Anemia





PROCEDURE:


The procedure, risks and benefits were discussed with Mr. Dillard and informed


consent was obtained.  Anesthesia sedated him with Diprivan.  He was placed in 

the left lateral decubitus position.





EGD:


The Pentax videoscope was introduced through the oropharynx and advanced to the 

second portion of the duodenum under direct visualization. Retroflexion was 

performed in the stomach.  Large ulcerated mass in the body of the stomach most 

likely consistent with malignancy biopsy was done





FINDINGS:


Large gastric ulcerated mass in the body with fresh blood most likely the 

source of the bleeding





Colonoscopy:


The Pentax videoscope was introduced through the rectum and advanced to to the 

ascending colon, the colon was very tortuous and the scope kept looping, not 

sure if I reached the cecum. Retroflexion was performed in the rectum. Colonic 

prep was good, 





FINDINGS:


Possibly incomplete colon, the examined area was normal





ESTIMATED BLOOD LOSS:


None





SPECIMENS REMOVED:


Body of the stomach





COMPLICATIONS:


None





IMPRESSION:


Gastric mass possible malignancy





PLAN:


Air contrast barium enema


Oncology consult


Surgical consult


Await biopsy results


CT of the abdomen and pelvis











Rabia Coates MD Jan 12, 2018 07:59

## 2018-01-12 NOTE — RADRPT
EXAM DATE/TIME:  01/12/2018 13:36 

 

[facility] COMPARISON:     

No previous studies available for comparison.

                     

 

INDICATIONS :     

Black stools, weight loss, upper abdomen pain, incomplete colonoscopy

                     

 

FLUORO TIME:     

2.2 minutes

 

IMAGE COUNT:     

27

                     

 

CONTRAST:      

1. Polibar ACB Barium Sulfate (96% w/w)

                     

 

MEDICAL HISTORY :     

Hypertension.          

 

SURGICAL HISTORY :     

None.   

 

ENCOUNTER:     

Subsequent                                        

 

ACUITY:     

1 month      

 

PAIN SCORE:     

1/10

 

LOCATION:     

Bilateral  abdomen  

 

FINDINGS:     

A balloon tip catheter was inserted into the rectum, and air and barium was instilled under fluorosco
pic control. The study was limited as the patient lost the tip of the rectal tube twice during the ex
amination and during the overhead examination.

 

Barium flows freely to the cecum without obstruction.  The colon is of a normal diameter.  There are 
no fixed polypoid filling defects or annular constricting lesions identified.  There is scattered iain
ris in the colon. Intermittent filling defects were seen secondary to the debris. A persistent reprod
ucible filling defect to suggest an actual polypoid lesion was not clearly seen.

 

No diverticula are seen.

 

A small amount of contrast is seen in the terminal ileum at the conclusion of the study. The appendix
 was not seen.

 

CONCLUSION:     Negative air contrast barium enema with scattered debris.

 

 

 

 Tejas Abreu MD on January 12, 2018 at 17:48           

Board Certified Radiologist.

 This report was verified electronically.

## 2018-01-12 NOTE — PD.ONC.PN
Objective


Data











  Date Time  Temp Pulse Resp B/P (MAP) Pulse Ox O2 Delivery O2 Flow Rate FiO2


 


1/12/18 12:00 99.1 74 18 142/67 (92) 98   


 


1/12/18 08:40 97.0 63 18 133/69 (90) 100   


 


1/12/18 08:23  59 16 118/58 (78) 100   


 


1/12/18 08:15  64 16 91/57 (68) 100   


 


1/12/18 07:53 97.9 68 16 103/56 (72) 100   


 


1/12/18 06:44 98.6 78 20 151/88 (109) 99   


 


1/12/18 00:00 97.1 74 20 132/66 99   


 


1/11/18 23:45 97.7 76 20 125/60 98   


 


1/11/18 21:03 98.0 77 20 116/57 99   


 


1/11/18 20:48 97.6 81 20 143/65 100   














 1/12/18 1/12/18 1/12/18





 07:00 15:00 23:00


 


Intake Total 800 ml 950 ml 


 


Balance 800 ml 950 ml 








Result Diagram:  


1/12/18 0544                                                                   

             1/12/18 0544





Laboratory Results





Laboratory Tests








Test


  1/12/18


05:44 1/12/18


10:04


 


White Blood Count 5.0 TH/MM3  


 


Red Blood Count 4.50 MIL/MM3  


 


Hemoglobin 9.5 GM/DL  


 


Hematocrit 31.1 %  


 


Mean Corpuscular Volume 69.3 FL  


 


Mean Corpuscular Hemoglobin 21.1 PG  


 


Mean Corpuscular Hemoglobin


Concent 30.4 % 


  


 


 


Red Cell Distribution Width 22.2 %  


 


Platelet Count 263 TH/MM3  


 


Mean Platelet Volume 7.5 FL  


 


Neutrophils (%) (Auto) 68.7 %  


 


Lymphocytes (%) (Auto) 17.2 %  


 


Monocytes (%) (Auto) 11.2 %  


 


Eosinophils (%) (Auto) 2.0 %  


 


Basophils (%) (Auto) 0.9 %  


 


Neutrophils # (Auto) 3.4 TH/MM3  


 


Lymphocytes # (Auto) 0.9 TH/MM3  


 


Monocytes # (Auto) 0.6 TH/MM3  


 


Eosinophils # (Auto) 0.1 TH/MM3  


 


Basophils # (Auto) 0.0 TH/MM3  


 


CBC Comment AUTO DIFF  


 


Differential Comment


  AUTO DIFF


CONFIRMED 


 


 


Ovalocytes 1+  


 


Blood Urea Nitrogen 22 MG/DL  


 


Creatinine 1.10 MG/DL  


 


Random Glucose 78 MG/DL  


 


Calcium Level 8.5 MG/DL  


 


Sodium Level 140 MEQ/L  


 


Potassium Level 4.0 MEQ/L  


 


Chloride Level 104 MEQ/L  


 


Carbon Dioxide Level 29.8 MEQ/L  


 


Anion Gap 6 MEQ/L  


 


Estimat Glomerular Filtration


Rate 65 ML/MIN 


  


 


 


Carcinoembryonic Antigen  0.7 NG/ML 


 


CA 15-3 Antigen  18.2 U/ML 


 


CA 19-9 Antigen  3.8 U/ML 








Imaging Studies





Last 24 hours Impressions








Abdomen/Pelvis CT 1/12/18 0000 Signed





Impressions: 





 Service Date/Time:  Friday, January 12, 2018 13:24 - CONCLUSION:  Probable 

wall 





 thickening involving the antrum of the stomach and adjacent lobular soft 

tissue 





 density masses which may be enlarged lymph nodes or other peritoneal masses. 





 Small volume of free pelvic fluid. CT of the abdomen with IV contrast would be 





 suggested for further evaluation if feasible.     Tejas Trejo MD 











Administered Medications








 Medications


  (Trade)  Dose


 Ordered  Sig/Dina


 Route


 PRN Reason  Start Time


 Stop Time Status Last Admin


Dose Admin


 


 Sodium Chloride  1,000 ml @ 


 75 mls/hr  [id number]


 IV


   1/11/18 13:33


    1/12/18 11:10


 


 


 Sodium Chloride


  (NS Flush)  2 ml  BID


 IV FLUSH


   1/11/18 21:00


    1/12/18 11:10


 


 


 Pantoprazole


 Sodium


  (Protonix Inj)  40 mg  Q12H


 IV PUSH


   1/11/18 14:00


    1/12/18 12:41


 








Objective Remarks


GENERAL: Well-nourished, well-developed patient.


SKIN: Warm and dry.


HEAD: Normocephalic.


EYES: No scleral icterus. No injection or drainage. 


NECK: Supple, trachea midline. No JVD or lymphadenopathy.


LYMPHATIC: No adenopathy.


CARDIOVASCULAR: Regular rate and rhythm without murmurs. 


RESPIRATORY: Breath sounds equal bilaterally. No accessory muscle use.


GASTROINTESTINAL: Abdomen soft, non-tender, nondistended. 


EXTREMITIES: No cyanosis, or edema. 


MUSCULOSKELETAL: Adequate muscle tone.


NEUROLOGICAL: No obvious focal deficit. Awake, alert, and oriented x3.


PSYCHIATRIC: Appropriate mood and affect; insight and judgment normal.











Vega Clark MD Jan 12, 2018 16:24

## 2018-01-12 NOTE — EKG
Date Performed: 01/11/2018       Time Performed: 11:28:31

 

PTAGE:      77 years

 

EKG:      Sinus rhythm 

 

 NORMAL ECG 

 

NO PREVIOUS TRACING            

 

DOCTOR:   Marco Antonio Patel  Interpretating Date/Time  01/12/2018 16:45:19

## 2018-01-12 NOTE — HHI.GIFU
Subjective


Remarks


Patient laying in bed comfortably without any new complaint, no abdominal pain, 

tolerated prep





Objective


Vitals I&O





Vital Signs








  Date Time  Temp Pulse Resp B/P (MAP) Pulse Ox O2 Delivery O2 Flow Rate FiO2


 


1/12/18 06:44 98.6 78 20 151/88 (109) 99   


 


1/12/18 00:00 97.1 74 20 132/66 99   


 


1/11/18 23:45 97.7 76 20 125/60 98   


 


1/11/18 21:03 98.0 77 20 116/57 99   


 


1/11/18 20:48 97.6 81 20 143/65 100   


 


1/11/18 16:00 97.5 76 18 146/69 (94) 100   


 


1/11/18 15:24        


 


1/11/18 14:45  72 18 133/65 (87) 100 Room Air  


 


1/11/18 09:50 98.0 102 20 160/64 (96) 100 Room Air  














I/O      


 


 1/11/18 1/11/18 1/11/18 1/12/18 1/12/18 1/12/18





 07:00 15:00 23:00 07:00 15:00 23:00


 


Intake Total   223 ml 800 ml  


 


Balance   223 ml 800 ml  


 


      


 


Intake Oral    0 ml  


 


IV Total   223 ml   


 


Packed Cells    800 ml  


 


# Voids    5  








Laboratory





Laboratory Tests








Test


  1/11/18


10:55 1/12/18


05:44


 


White Blood Count 8.2  5.0 


 


Red Blood Count 3.94  4.50 


 


Hemoglobin 7.9  9.5 


 


Hematocrit 24.8  31.1 


 


Mean Corpuscular Volume 63.0  69.3 


 


Mean Corpuscular Hemoglobin 20.0  21.1 


 


Mean Corpuscular Hemoglobin


Concent 31.8 


  30.4 


 


 


Red Cell Distribution Width 17.6  22.2 


 


Platelet Count 366  263 


 


Mean Platelet Volume 7.6  7.5 


 


Neutrophils (%) (Auto) 78.8  68.7 


 


Lymphocytes (%) (Auto) 11.4  17.2 


 


Monocytes (%) (Auto) 8.1  11.2 


 


Eosinophils (%) (Auto) 0.3  2.0 


 


Basophils (%) (Auto) 1.4  0.9 


 


Neutrophils # (Auto) 6.4  3.4 


 


Lymphocytes # (Auto) 0.9  0.9 


 


Monocytes # (Auto) 0.7  0.6 


 


Eosinophils # (Auto) 0.0  0.1 


 


Basophils # (Auto) 0.1  0.0 


 


CBC Comment DIFF FINAL  AUTO DIFF 


 


Differential Comment


   


  AUTO DIFF


CONFIRMED


 


Blood Urea Nitrogen 32  22 


 


Creatinine 1.27  1.10 


 


Random Glucose 108  78 


 


Calcium Level 9.1  8.5 


 


Sodium Level 135  140 


 


Potassium Level 4.1  4.0 


 


Chloride Level 101  104 


 


Carbon Dioxide Level 24.7  29.8 


 


Anion Gap 9  6 


 


Estimat Glomerular Filtration


Rate 55 


  65 


 


 


Iron Level 12  


 


Total Iron Binding Capacity 493  


 


Percent Iron Saturation 2.4  


 


Ferritin 5  


 


Total Bilirubin 0.3  


 


Direct Bilirubin 0.1  


 


Indirect Bilirubin 0.2  


 


Aspartate Amino Transf


(AST/SGOT) 22 


  


 


 


Alanine Aminotransferase


(ALT/SGPT) 21 


  


 


 


Alkaline Phosphatase 61  


 


Total Protein 7.6  


 


Albumin 3.9  


 


Ovalocytes  1+ 








Physical Exam


HEENT: Pupils round and reactive to light; normocephalic; atraumatic; no 

jaundice.  Throat is clear.


NECK: Neck is supple, no JVD, no lymphadenopathy.


CHEST:  Chest is clear to auscultation and percussion.


CARDIAC:  Regular rate and rhythm with no murmur gallop or rubs.


ABDOMEN:  Soft, nondistended, nontender; no hepatosplenomegaly; bowel sounds 

are present in all four quadrants.


EXTREMITIES: No clubbing, cyanosis, or edema.


SKIN:  Normal; no rash; no jaundice.


CNS:  No focal deficits; alert and oriented times three.





Assessment and Plan


Plan


Assessment


- Melena- Complaining of black, tarry stools x 1 month, increasing in frequency 

today.  Presented


  after syncopal episode.  H/H currently 7.9/24.8.  Denies NSAIDs, blood 

thinners, ETOH, smoking.


  Has never had EGD.  Plan for EGD tomorrow.  Pt has 2 U PRBC ordered per 

attending.  


- Weight loss- 20 pound weight loss over the past month.  Last colonoscopy was 

approx 3-5 years


  ago, reports normal exam.  Will plan for CT abdomen and pelvis today and 

colonoscopy tomorrow.





1/12/2019


Patient hemoglobin is stable, no more bleeding, tolerated prep, did not get CT 

scan yet, had an upper endoscopy and a colonoscopy





IMPRESSION:


Gastric mass possible malignancy


Redundant colon, not sure if we went all the way to the cecum





PLAN:


Air contrast barium enema


Oncology consult


Surgical consult


Await biopsy results


CT of the abdomen and pelvis











Rabia Coates MD Jan 12, 2018 08:03

## 2018-01-12 NOTE — HHI.PR
Subjective


Remarks


Feels better today. Tired. No n/v/d/c. No abd pain . Denies chest pain or sob. 


Appetite is fairly well.





Objective


Vitals





Vital Signs








  Date Time  Temp Pulse Resp B/P (MAP) Pulse Ox O2 Delivery O2 Flow Rate FiO2


 


1/12/18 06:44 98.6 78 20 151/88 (109) 99   


 


1/12/18 00:00 97.1 74 20 132/66 99   


 


1/11/18 23:45 97.7 76 20 125/60 98   


 


1/11/18 21:03 98.0 77 20 116/57 99   


 


1/11/18 20:48 97.6 81 20 143/65 100   


 


1/11/18 16:00 97.5 76 18 146/69 (94) 100   


 


1/11/18 15:24        


 


1/11/18 14:45  72 18 133/65 (87) 100 Room Air  


 


1/11/18 09:50 98.0 102 20 160/64 (96) 100 Room Air  














I/O      


 


 1/11/18 1/11/18 1/11/18 1/12/18 1/12/18 1/12/18





 07:00 15:00 23:00 07:00 15:00 23:00


 


Intake Total   223 ml 800 ml  


 


Balance   223 ml 800 ml  


 


      


 


Intake Oral    0 ml  


 


IV Total   223 ml   


 


Packed Cells    800 ml  


 


# Voids    5  








Result Diagram:  


1/12/18 0544                                                                   

             1/12/18 0544





Imaging





Last Impressions








Cervical Spine X-Ray 1/11/18 0000 Signed





Impressions: 





 Service Date/Time:  Thursday, January 11, 2018 10:41 - CONCLUSION:  1. No 

acute 





 fracture or malalignment. 2. Degenerative disc change at C5-6 and C6-7.     





 Kenny Chavez MD 








Objective Remarks


GENERAL: Well-developed well-nourished pleasant elderly male patient in [facility]. 


CARDIOVASCULAR: Regular rate and rhythm.  No murmur appreciated. 


RESPIRATORY: No accessory muscle use. Clear to auscultation. Breath sounds 

equal bilaterally. 


GASTROINTESTINAL: Abdomen soft, non-tender, nondistended. Hepatic and splenic 

margins not palpable. 


MUSCULOSKELETAL: Extremities without clubbing, cyanosis, or edema. No obvious 

deformities. 


NEUROLOGICAL: Awake and alert. No obvious cranial nerve deficits.  Motor 

grossly within normal limits. Moving all extremities spontaneously.  Normal 

speech.


PSYCHIATRIC: Appropriate mood and affect; insight and judgment normal.








A/P


Assessment and Plan





77-year-old male with hypertension, hyperlipidemia, arthritis, presents after a 

syncopal episode with black stools. 





Symptomatic Anemia secondary to GI Bleed from possible gastric mass 


S/P EGD with biopsy, colonoscopy. With Gastric mass possible malignancy


Hgb 7.9, on admission,  previously 15.9 in 2013. Rectal exam done by ER 

physician positive for melena, Hemoccult+. 


   -s/p transfusion 2u pRBCs


   -monitor serial H&H


   -on IV Protonix 40mg q12h


   -supportive treatment with IVF hydration, antiemetics prn, pain control prn


   -consult gastroenterology, ff 


   -S/P EGD with biopsy, colonoscopy. With Gastric mass possible malignancy


   -Air contrast barium enema


   -Oncology consult


   -Surgical consult


   -Biopsy results are oending 


   -CT of the abdomen and pelvis








Syncope: suspect secondary to anemia as above in combination with mild 

dehydration.  


   -giving blood transfusion x2u


   -continue IVF hydration


   -monitor for improvement





Dehydration, Mild SAUMYA: Cr 1.27, previously 0.93 in 2013. 


   -give IVF hydration at 75cc/hr


   -repeat BMP in am





Hypertension: chronic, stable


   -hold patient lisinopril and hctz for now


   -monitor BP, add antihypertensives as needed





DVT Prophylaxis: teds/SCDs; avoid chemoprophylaxis with GI bleed as above








Discussed with the patient, nurse











Sparkle Badillo MD Jan 12, 2018 08:19

## 2018-01-12 NOTE — RADRPT
EXAM DATE/TIME:  01/12/2018 13:24 

 

[facility] COMPARISON:     

No previous studies available for comparison.

 

 

INDICATIONS :     

Upper abdominal pain, black tarry stools and weight loss x 1 month. 

                  

 

ORAL CONTRAST:      

Prescribed oral contrast ingested.

                  

 

RADIATION DOSE:     

6.90 CTDIvol (mGy) 

 

 

MEDICAL HISTORY :     

Hypertension.  

 

SURGICAL HISTORY :      

None. 

 

ENCOUNTER:      

Initial

 

ACUITY:      

1 month

 

PAIN SCALE:      

2/10

 

LOCATION:        

upper quadrant 

 

TECHNIQUE:     

Volumetric scanning of the abdomen and pelvis was performed.  Using automated exposure control and ad
justment of the mA and/or kV according to patient size, radiation dose was kept as low as reasonably 
achievable to obtain optimal diagnostic quality images.  DICOM format image data is available electro
nically for review and comparison.  

 

FINDINGS:     

 

LOWER LUNGS:     

The visualized lower lungs are clear.

 

LIVER:     

Homogeneous density without lesion.  There is no dilation of the biliary tree.  No calcified gallston
es.

 

SPLEEN:     

Normal size without lesion.

 

PANCREAS:     

Within normal limits. 

 

KIDNEYS:     

Normal in size and shape.  There is no mass, stone, or hydronephrosis.

 

ADRENAL GLANDS:     

Within normal limits.

 

VASCULAR:     

There is no aortic aneurysm.

 

BOWEL/MESENTERY:     

There appears to be some lobular wall thickening involving the antrum of the stomach. There are some 
separate circumscribed lobular masses adjacent to the greater curvature in the region of the antrum a
nd also in the gastrohepatic ligament which may be enlarged lymph nodes or other peritoneal masses. I
ntravenous contrast would be required for better delineation. The large and small bowel are normal in
 caliber. There is a small volume of free pelvic fluid which is nonspecific.

 

ABDOMINAL WALL:     

Within normal limits.

 

RETROPERITONEUM:     

There is no lymphadenopathy.

 

BLADDER:     

No wall thickening or mass.

 

REPRODUCTIVE:     

Within normal limits.

 

INGUINAL:     

There is no lymphadenopathy or hernia.

 

MUSCULOSKELETAL:     

Within normal limits for patient age.

 

CONCLUSION:     

Probable wall thickening involving the antrum of the stomach and adjacent lobular soft tissue density
 masses which may be enlarged lymph nodes or other peritoneal masses. Small volume of free pelvic flu
id. CT of the abdomen with IV contrast would be suggested for further evaluation if feasible.

 

 

 

 Tejas Trejo MD on January 12, 2018 at 14:44           

Board Certified Radiologist.

 This report was verified electronically.

## 2018-01-13 VITALS
DIASTOLIC BLOOD PRESSURE: 81 MMHG | OXYGEN SATURATION: 98 % | TEMPERATURE: 98.7 F | SYSTOLIC BLOOD PRESSURE: 171 MMHG | RESPIRATION RATE: 18 BRPM | HEART RATE: 72 BPM

## 2018-01-13 VITALS
OXYGEN SATURATION: 97 % | DIASTOLIC BLOOD PRESSURE: 62 MMHG | RESPIRATION RATE: 20 BRPM | TEMPERATURE: 97.5 F | SYSTOLIC BLOOD PRESSURE: 133 MMHG | HEART RATE: 80 BPM

## 2018-01-13 VITALS
HEART RATE: 80 BPM | TEMPERATURE: 98.5 F | RESPIRATION RATE: 18 BRPM | SYSTOLIC BLOOD PRESSURE: 160 MMHG | DIASTOLIC BLOOD PRESSURE: 82 MMHG | OXYGEN SATURATION: 96 %

## 2018-01-13 VITALS
DIASTOLIC BLOOD PRESSURE: 70 MMHG | TEMPERATURE: 98.9 F | OXYGEN SATURATION: 97 % | SYSTOLIC BLOOD PRESSURE: 141 MMHG | RESPIRATION RATE: 18 BRPM | HEART RATE: 69 BPM

## 2018-01-13 LAB
% SATURATION IRON PROFILE: 4.5 % (ref 20–50)
BASOPHILS # BLD AUTO: 0 TH/MM3 (ref 0–0.2)
BASOPHILS NFR BLD: 0.8 % (ref 0–2)
BUN SERPL-MCNC: 20 MG/DL (ref 7–18)
CALCIUM SERPL-MCNC: 8.4 MG/DL (ref 8.5–10.1)
CHLORIDE SERPL-SCNC: 108 MEQ/L (ref 98–107)
CREAT SERPL-MCNC: 0.89 MG/DL (ref 0.6–1.3)
EOSINOPHIL # BLD: 0.1 TH/MM3 (ref 0–0.4)
EOSINOPHIL NFR BLD: 2.2 % (ref 0–4)
ERYTHROCYTE [DISTWIDTH] IN BLOOD BY AUTOMATED COUNT: 22.3 % (ref 11.6–17.2)
FERRITIN SERPL-MCNC: 7 NG/ML (ref 26–388)
GFR SERPLBLD BASED ON 1.73 SQ M-ARVRAT: 83 ML/MIN (ref 89–?)
GLUCOSE SERPL-MCNC: 93 MG/DL (ref 74–106)
HCO3 BLD-SCNC: 28.3 MEQ/L (ref 21–32)
HCT VFR BLD CALC: 29 % (ref 39–51)
HGB BLD-MCNC: 9.3 GM/DL (ref 13–17)
IRON (FE): 16 MCG/DL (ref 65–175)
LYMPHOCYTES # BLD AUTO: 0.9 TH/MM3 (ref 1–4.8)
LYMPHOCYTES NFR BLD AUTO: 17.6 % (ref 9–44)
MCH RBC QN AUTO: 22.4 PG (ref 27–34)
MCHC RBC AUTO-ENTMCNC: 32.2 % (ref 32–36)
MCV RBC AUTO: 69.6 FL (ref 80–100)
MONOCYTE #: 0.6 TH/MM3 (ref 0–0.9)
MONOCYTES NFR BLD: 11.4 % (ref 0–8)
NEUTROPHILS # BLD AUTO: 3.6 TH/MM3 (ref 1.8–7.7)
NEUTROPHILS NFR BLD AUTO: 68 % (ref 16–70)
OVALOCYTES BLD QL SMEAR: (no result)
PLATELET # BLD: 256 TH/MM3 (ref 150–450)
PMV BLD AUTO: 7.3 FL (ref 7–11)
RBC # BLD AUTO: 4.16 MIL/MM3 (ref 4.5–5.9)
SODIUM SERPL-SCNC: 142 MEQ/L (ref 136–145)
TIBC SERPL-MCNC: 358 MCG/DL (ref 250–450)
VIT B12 SERPL-MCNC: 176 PG/ML (ref 193–986)
WBC # BLD AUTO: 5.2 TH/MM3 (ref 4–11)

## 2018-01-13 RX ADMIN — PHENYTOIN SODIUM SCH MLS/HR: 50 INJECTION INTRAMUSCULAR; INTRAVENOUS at 05:41

## 2018-01-13 RX ADMIN — PANTOPRAZOLE SODIUM SCH MG: 40 INJECTION, POWDER, FOR SOLUTION INTRAVENOUS at 14:38

## 2018-01-13 RX ADMIN — PANTOPRAZOLE SODIUM SCH MG: 40 INJECTION, POWDER, FOR SOLUTION INTRAVENOUS at 01:52

## 2018-01-13 RX ADMIN — Medication SCH ML: at 09:03

## 2018-01-13 NOTE — HHI.PR
Subjective


Remarks


The patient is in the bed appears not to be distress his ambulating without any 

problems.  Feels weak.  No nausea or vomiting no diarrhea or constipation.  Had 

a normal bowel movement no blood in it.  No melena.  Eating but not much.  

Denies abdominal pain.  No chest pain shortness of breath.  No palpitations.





Objective


Vitals





Vital Signs








  Date Time  Temp Pulse Resp B/P (MAP) Pulse Ox O2 Delivery O2 Flow Rate FiO2


 


1/13/18 08:00 98.9 69 18 141/70 (93) 97   


 


1/13/18 00:00 97.5 80 20 133/62 (85) 97   


 


1/12/18 20:00 98.5 84 20 133/61 (85) 97   


 


1/12/18 17:36 98.0 80 20 140/8 (52) 96   


 


1/12/18 12:00 97.8 78 18 135/67 (89) 98   


 


1/12/18 12:00 99.1 74 18 142/67 (92) 98   














I/O      


 


 1/12/18 1/12/18 1/12/18 1/13/18 1/13/18 1/13/18





 07:00 15:00 23:00 07:00 15:00 23:00


 


Intake Total 800 ml 1500 ml 1060 ml 1240 ml  


 


Balance 800 ml 1500 ml 1060 ml 1240 ml  


 


      


 


Intake Oral 0 ml 550 ml 400 ml 240 ml  


 


IV Total   660 ml 1000 ml  


 


Packed Cells 800 ml     


 


Other  950 ml    


 


# Voids 5 5 4 3  


 


# Bowel Movements  1 1   








Result Diagram:  


1/13/18 0735                                                                   

             1/13/18 0735





Imaging





Last Impressions








Barium Enema w/Air Contrast 1/12/18 0000 Signed





Impressions: 





 Service Date/Time:  Friday, January 12, 2018 13:36 - CONCLUSION: Negative air 





 contrast barium enema with scattered debris.     Tejas Abreu MD 


 


Abdomen/Pelvis CT 1/12/18 0000 Signed





Impressions: 





 Service Date/Time:  Friday, January 12, 2018 13:24 - CONCLUSION:  Probable 

wall 





 thickening involving the antrum of the stomach and adjacent lobular soft 

tissue 





 density masses which may be enlarged lymph nodes or other peritoneal masses. 





 Small volume of free pelvic fluid. CT of the abdomen with IV contrast would be 





 suggested for further evaluation if feasible.     Tejas Trejo MD 


 


Cervical Spine X-Ray 1/11/18 0000 Signed





Impressions: 





 Service Date/Time:  Thursday, January 11, 2018 10:41 - CONCLUSION:  1. No 

acute 





 fracture or malalignment. 2. Degenerative disc change at C5-6 and C6-7.     





 Kenny Chavez MD 








Objective Remarks


GENERAL: Well-developed well-nourished pleasant elderly male patient in NAD. 


CARDIOVASCULAR: Regular rate and rhythm.  No murmur appreciated. 


RESPIRATORY: No accessory muscle use. Clear to auscultation. Breath sounds 

equal bilaterally. 


GASTROINTESTINAL: Abdomen soft, non-tender, nondistended. Hepatic and splenic 

margins not palpable. 


MUSCULOSKELETAL: Extremities without clubbing, cyanosis, or edema. No obvious 

deformities. 


NEUROLOGICAL: Awake and alert. No obvious cranial nerve deficits.  Motor 

grossly within normal limits. Moving all extremities spontaneously.  Normal 

speech.


PSYCHIATRIC: Appropriate mood and affect; insight and judgment normal.








A/P


Assessment and Plan





77-year-old male with hypertension, hyperlipidemia, arthritis, presents after a 

syncopal episode with black stools. 





Symptomatic Anemia secondary to GI Bleed from large ulcerated gastric mass 


S/P EGD with biopsy, colonoscopy. With large ulcerated Gastric mass possible 

malignancy


Hgb 7.9, on admission,  previously 15.9 in 2013. Rectal exam done by ER 

physician positive for melena, Hemoccult+. 


   -s/p transfusion 2u pRBCs


   -monitor serial H&H


   -on IV Protonix 40mg q12h


   -supportive treatment with IVF hydration, antiemetics prn, pain control prn


   -consult gastroenterology, ff 


   -S/P EGD with biopsy, colonoscopy. With Gastric mass possible malignancy


   -Air contrast barium enema


   -Oncology consult, seen by Dr Dumont  Differential diagnoses include primary 

gastric malignancy versus lymphoid malignancy such as diffuse large B-cell 

lymphoma.


   -Oral iron replacement therapy for management of symptomatic anemia.


   -LDH will be ordered because lymphoma is in the differential diagnosis.


   -Surgical consult


   -Biopsy results are pending 


   -CT of the abdomen and pelvis reviewed 








Syncope: suspect secondary to anemia as above in combination with mild 

dehydration.  


   -giving blood transfusion x2u


   -continue IVF hydration


   -monitor for improvement





Dehydration, Mild SAUMYA: Cr 1.27, on admission previously 0.93 in 2013. Improved 


   -give IVF hydration at 75cc/hr


   -repeat BMP in am





Hypertension: chronic, stable


   -hold patient lisinopril and hctz for now


   -monitor BP, add antihypertensives as needed





DVT Prophylaxis: teds/SCDs; avoid chemoprophylaxis with GI bleed as above








DC when able to tolerated food and cleared by consultants. 





Discussed with the patient, nurse











Sparkle Badillo MD Jan 13, 2018 09:11
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
## 2018-01-13 NOTE — PD.CAR.PN
CVT Progress Note


Subjective/Hospital Course:


1/13/18


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 

carcinoma of the antrum.


I received to consult about 2 hours ago and came to see the patient yet patient 

was discharged in the meantime without my knowledge


Considering the patient has left the hospital and I can see him I will try to 

contact the patient and bring him into my office as an outpatient


By that time pathology should be back and we should decide on the best course 

of treatment between surgery and oncology


Clearly local control has to be achieved considering the bleeding but the tumor 

may be beyond the confines of resection for even palliative purposes


Thanks


J


Objective:





Vital Signs








  Date Time  Temp Pulse Resp B/P (MAP) Pulse Ox O2 Delivery O2 Flow Rate FiO2


 


1/13/18 16:00 98.7 72 18 171/81 (111) 98   


 


1/13/18 12:00 98.5 80 18 160/82 (108) 96   


 


1/13/18 08:00 98.9 69 18 141/70 (93) 97   


 


1/13/18 00:00 97.5 80 20 133/62 (85) 97   


 


1/12/18 20:00 98.5 84 20 133/61 (85) 97   








Labs:





Laboratory Tests








Test


  1/13/18


07:35


 


White Blood Count


  5.2 TH/MM3


(4.0-11.0)


 


Red Blood Count


  4.16 MIL/MM3


(4.50-5.90)


 


Hemoglobin


  9.3 GM/DL


(13.0-17.0)


 


Hematocrit


  29.0 %


(39.0-51.0)


 


Mean Corpuscular Volume


  69.6 FL


(80.0-100.0)


 


Mean Corpuscular Hemoglobin


  22.4 PG


(27.0-34.0)


 


Mean Corpuscular Hemoglobin


Concent 32.2 %


(32.0-36.0)


 


Red Cell Distribution Width


  22.3 %


(11.6-17.2)


 


Platelet Count


  256 TH/MM3


(150-450)


 


Mean Platelet Volume


  7.3 FL


(7.0-11.0)


 


Neutrophils (%) (Auto)


  68.0 %


(16.0-70.0)


 


Lymphocytes (%) (Auto)


  17.6 %


(9.0-44.0)


 


Monocytes (%) (Auto)


  11.4 %


(0.0-8.0)


 


Eosinophils (%) (Auto)


  2.2 %


(0.0-4.0)


 


Basophils (%) (Auto)


  0.8 %


(0.0-2.0)


 


Neutrophils # (Auto)


  3.6 TH/MM3


(1.8-7.7)


 


Lymphocytes # (Auto)


  0.9 TH/MM3


(1.0-4.8)


 


Monocytes # (Auto)


  0.6 TH/MM3


(0-0.9)


 


Eosinophils # (Auto)


  0.1 TH/MM3


(0-0.4)


 


Basophils # (Auto)


  0.0 TH/MM3


(0-0.2)


 


CBC Comment AUTO DIFF 


 


Differential Comment


  AUTO DIFF


CONFIRMED


 


Ovalocytes 1+ (NORMAL) 


 


Blood Urea Nitrogen


  20 MG/DL


(7-18)


 


Creatinine


  0.89 MG/DL


(0.60-1.30)


 


Random Glucose


  93 MG/DL


()


 


Calcium Level


  8.4 MG/DL


(8.5-10.1)


 


Sodium Level


  142 MEQ/L


(136-145)


 


Potassium Level


  4.1 MEQ/L


(3.5-5.1)


 


Chloride Level


  108 MEQ/L


()


 


Carbon Dioxide Level


  28.3 MEQ/L


(21.0-32.0)


 


Anion Gap 6 MEQ/L (5-15) 


 


Estimat Glomerular Filtration


Rate 83 ML/MIN


(>89)


 


Iron Level


  16 MCG/DL


()


 


Total Iron Binding Capacity


  358 MCG/DL


(250-450)


 


Percent Iron Saturation 4.5 % (20-50) 


 


Ferritin


  7 NG/ML


()


 


Vitamin B12 Level


  176 PG/ML


(193-986)








Result Diagram:  


1/13/18 0735                                                                   

             1/13/18 0735














Vin Webb MD Jan 13, 2018 17:39

## 2018-01-13 NOTE — PQ
Physician Query Response Document

 

 

PATIENT:               ARINA DOBBS

ACCT #:                  [id number]

MRN:                       [id number]

:                       1940

ADMIT DATE:       2018 1:20 PM

DISCH DATE:

RESPONDING

PROVIDER #:        [email or web address]

 

 

QUERY TEXT:

 

Anemia Type

 

Anemia is documented in the Medical Record.  Please specify the cause (includes suspected or probable
 cause)

Such as:

-- Due to acute blood loss

-- Due to chronic blood loss

-- Due to iron deficiency

-- Due to postoperative blood loss

-- Due to chronic disease

-- Other, please specify

 

The patient's Clinical Indicators include:

syncope and collapse

hgb 7.9 hct 24.8 re blood transfusion x 2

rectal bleeding

endoscopy w gastric mass per GI source of GI bleed

 

 

Query created by: Pam Briggs on 2018 10:31 AM

 

RESPONSE TEXT:

 

Anemia 2/2 acute blood loss internal and external hemorrhoids, also might be 2/2 hematologic disorder
 work up also pending follow up with hematology as well and plan for Capsule endoscopy as OP

 

 

 

 

 

 

 

Electronically signed by:  Sparkle Badillo MD 2018 10:48 AM

## 2018-01-13 NOTE — HHI.GIFU
Subjective


Remarks


Patient laying in bed comfortably, and platelet with family and friends, deny 

any GI bleed, tolerated food well, hemoglobin stable, was seen by oncology 

yesterday





Objective


Vitals I&O





Vital Signs








  Date Time  Temp Pulse Resp B/P (MAP) Pulse Ox O2 Delivery O2 Flow Rate FiO2


 


1/13/18 12:00 98.5 80 18 160/82 (108) 96   


 


1/13/18 08:00 98.9 69 18 141/70 (93) 97   


 


1/13/18 00:00 97.5 80 20 133/62 (85) 97   


 


1/12/18 20:00 98.5 84 20 133/61 (85) 97   


 


1/12/18 17:36 98.0 80 20 140/8 (52) 96   














I/O      


 


 1/12/18 1/12/18 1/12/18 1/13/18 1/13/18 1/13/18





 07:00 15:00 23:00 07:00 15:00 23:00


 


Intake Total 800 ml 1500 ml 1060 ml 1240 ml 480 ml 


 


Balance 800 ml 1500 ml 1060 ml 1240 ml 480 ml 


 


      


 


Intake Oral 0 ml 550 ml 400 ml 240 ml 480 ml 


 


IV Total   660 ml 1000 ml  


 


Packed Cells 800 ml     


 


Other  950 ml    


 


# Voids 5 5 4 3 3 


 


# Bowel Movements  1 1   








Laboratory





Laboratory Tests








Test


  1/13/18


07:35


 


White Blood Count 5.2 


 


Red Blood Count 4.16 


 


Hemoglobin 9.3 


 


Hematocrit 29.0 


 


Mean Corpuscular Volume 69.6 


 


Mean Corpuscular Hemoglobin 22.4 


 


Mean Corpuscular Hemoglobin


Concent 32.2 


 


 


Red Cell Distribution Width 22.3 


 


Platelet Count 256 


 


Mean Platelet Volume 7.3 


 


Neutrophils (%) (Auto) 68.0 


 


Lymphocytes (%) (Auto) 17.6 


 


Monocytes (%) (Auto) 11.4 


 


Eosinophils (%) (Auto) 2.2 


 


Basophils (%) (Auto) 0.8 


 


Neutrophils # (Auto) 3.6 


 


Lymphocytes # (Auto) 0.9 


 


Monocytes # (Auto) 0.6 


 


Eosinophils # (Auto) 0.1 


 


Basophils # (Auto) 0.0 


 


CBC Comment AUTO DIFF 


 


Differential Comment


  AUTO DIFF


CONFIRMED


 


Ovalocytes 1+ 


 


Blood Urea Nitrogen 20 


 


Creatinine 0.89 


 


Random Glucose 93 


 


Calcium Level 8.4 


 


Sodium Level 142 


 


Potassium Level 4.1 


 


Chloride Level 108 


 


Carbon Dioxide Level 28.3 


 


Anion Gap 6 


 


Estimat Glomerular Filtration


Rate 83 


 


 


Iron Level 16 


 


Total Iron Binding Capacity 358 


 


Percent Iron Saturation 4.5 


 


Ferritin 7 


 


Vitamin B12 Level 176 








Physical Exam


HEENT: Pupils round and reactive to light; normocephalic; atraumatic; no 

jaundice.  Throat is clear.


NECK: Neck is supple, no JVD, no lymphadenopathy.


CHEST:  Chest is clear to auscultation and percussion.


CARDIAC:  Regular rate and rhythm with no murmur gallop or rubs.


ABDOMEN:  Soft, nondistended, nontender; no hepatosplenomegaly; bowel sounds 

are present in all four quadrants.


EXTREMITIES: No clubbing, cyanosis, or edema.


SKIN:  Normal; no rash; no jaundice.


CNS:  No focal deficits; alert and oriented times three.





Assessment and Plan


Plan


Assessment


- Melena- Complaining of black, tarry stools x 1 month, increasing in frequency 

today.  Presented


  after syncopal episode.  H/H currently 7.9/24.8.  Denies NSAIDs, blood 

thinners, ETOH, smoking.


  Has never had EGD.  Plan for EGD tomorrow.  Pt has 2 U PRBC ordered per 

attending.  


- Weight loss- 20 pound weight loss over the past month.  Last colonoscopy was 

approx 3-5 years


  ago, reports normal exam.  Will plan for CT abdomen and pelvis today and 

colonoscopy tomorrow.





1/12/2019


Patient hemoglobin is stable, no more bleeding, tolerated prep, did not get CT 

scan yet, had an upper endoscopy and a colonoscopy





1/13/2019 patient had significant anemia with GI bleed, agent has large ulcer 

in the antrum, CT scan consistent with cancer with possible metastases, was 

seen by oncology yesterday, no active bleeding, colon is normal





IMPRESSION:


Gastric mass possible malignancy





PLAN:





Oncology follow-up as an


Surgical consult as an outpatient


Await biopsy results


Okay to discharge from GI perspective


Discussed with Rabia Barger MD Jan 13, 2018 15:34

## 2018-01-24 ENCOUNTER — HOSPITAL ENCOUNTER (OUTPATIENT)
Dept: HOSPITAL 17 - HROP | Age: 78
Discharge: HOME | End: 2018-01-24
Attending: INTERNAL MEDICINE
Payer: COMMERCIAL

## 2018-01-24 VITALS
HEART RATE: 93 BPM | TEMPERATURE: 97.8 F | OXYGEN SATURATION: 99 % | DIASTOLIC BLOOD PRESSURE: 87 MMHG | SYSTOLIC BLOOD PRESSURE: 142 MMHG | RESPIRATION RATE: 20 BRPM

## 2018-01-24 VITALS
HEART RATE: 74 BPM | RESPIRATION RATE: 18 BRPM | DIASTOLIC BLOOD PRESSURE: 59 MMHG | TEMPERATURE: 97.8 F | OXYGEN SATURATION: 98 % | SYSTOLIC BLOOD PRESSURE: 126 MMHG

## 2018-01-24 VITALS — HEIGHT: 71 IN | WEIGHT: 140.21 LBS | BODY MASS INDEX: 19.63 KG/M2

## 2018-01-24 VITALS
SYSTOLIC BLOOD PRESSURE: 115 MMHG | HEART RATE: 74 BPM | DIASTOLIC BLOOD PRESSURE: 64 MMHG | RESPIRATION RATE: 16 BRPM | OXYGEN SATURATION: 99 %

## 2018-01-24 VITALS
HEART RATE: 73 BPM | RESPIRATION RATE: 16 BRPM | DIASTOLIC BLOOD PRESSURE: 66 MMHG | OXYGEN SATURATION: 99 % | SYSTOLIC BLOOD PRESSURE: 119 MMHG

## 2018-01-24 VITALS
DIASTOLIC BLOOD PRESSURE: 58 MMHG | SYSTOLIC BLOOD PRESSURE: 110 MMHG | OXYGEN SATURATION: 98 % | HEART RATE: 70 BPM | RESPIRATION RATE: 16 BRPM

## 2018-01-24 DIAGNOSIS — Z45.2: Primary | ICD-10-CM

## 2018-01-24 DIAGNOSIS — I10: ICD-10-CM

## 2018-01-24 DIAGNOSIS — C16.9: ICD-10-CM

## 2018-01-24 LAB
INR PPP: 1.1 RATIO
PROTHROMBIN TIME: 10.9 SEC (ref 9.8–11.6)

## 2018-01-24 PROCEDURE — C1788 PORT, INDWELLING, IMP: HCPCS

## 2018-01-24 PROCEDURE — 76937 US GUIDE VASCULAR ACCESS: CPT

## 2018-01-24 PROCEDURE — 36561 INSERT TUNNELED CV CATH: CPT

## 2018-01-24 PROCEDURE — 85730 THROMBOPLASTIN TIME PARTIAL: CPT

## 2018-01-24 PROCEDURE — 99152 MOD SED SAME PHYS/QHP 5/>YRS: CPT

## 2018-01-24 PROCEDURE — 85610 PROTHROMBIN TIME: CPT

## 2018-01-24 PROCEDURE — 99153 MOD SED SAME PHYS/QHP EA: CPT

## 2018-01-24 PROCEDURE — 77001 FLUOROGUIDE FOR VEIN DEVICE: CPT

## 2018-01-24 NOTE — RADRPT
EXAM DATE/TIME:  01/24/2018 08:53 

 

[facility] COMPARISON:  

No previous studies available for comparison.

                     

 

INDICATIONS :               

Patient presents with gastric cancer in need of port placement. 

                     

 

MEDICAL HISTORY :     

HTN

Gastricadenocarcinoma

Ulcer

 

SURGICAL HISTORY :     

EGD

 

ENCOUNTER:     

Initial

 

ACUITY:     

3 months

 

PAIN SCORE:     

0/10

                     

 

FLUORO TIME:     

0.3 minutes

 

IMAGE SERIES:      

1

 

SEDATION TIME:       

30 minutes

                     

 

ACCESS:     

Right internal jugular vein 

 

SEDATION:      

1.)  2  midazolam (Versed)  IV     

2.)  100  fentanyl (Sublimaze)  IV     

      

Prophylactic antibiotics were administered with appropriate pre-procedure timing.     

Vancomycin within 2 hours of procedure, Ancef (or alternative) within 1 hour of procedure.     

      

 

DEVICE:      

1.  8 American single lumen  cm Bard Power Port      

 

 

PROCEDURE :     

1.  Continuous pulse oximetry and EKG monitoring.

2.  Intravenous conscious sedation.

3.  Ultrasound guidance for venous access.

4.  Fluoroscopic guided implantable central venous port placement.

 

The patient was placed supine. The neck was prepped in sterile fashion.  Full sterile technique was u
sed, including cap, mask, sterile gloves and gown, and a large sterile sheet.  Hand hygiene and 2% ch
lorhexidine Betadine was utilized per protocol for cutaneous antisepsis with appropriate dry time for
 site.  Sterile gel and sterile probe cover were utilized for ultrasound guidance.   The skin and sub
cutaneous tissues were infiltrated with local anesthetic solution.  

 

Under direct ultrasound guidance, central venous access was accomplished in the targeted vessel.  The
 ultrasound images depicting access guidance were stored and saved to PACS for permanent record.  A s
ubcutaneous pocket was created using blunt dissection.  The port was introduced to the pocket.  The c
atheter tubing was fed through a subcutaneous tunnel to the venotomy site.  The catheter tubing was c
ut to a suitable length and then was introduced through a valved Peel-Away sheath and positioned with
 catheter tubing tip at the cavo-atrial junction level.  The pocket incision was closed with subcutic
ular Vicryl suture.  Steri-Strips were applied.  The port was flushed and locked with heparin solutio
n per protocol.  Sterile dressing was applied to the site.  The patient tolerated the procedure well.


 

Conscious sedation was performed with the prescribed dosages and duration as above in the presence of
 an independent trained radiology nurse to assist in the monitoring of the patient.  EKG and oximetry
 remained stable throughout the procedure. The patient tolerated the procedure well and there were no
 complications. The patient was sent to post anesthesia recovery in stable condition.

 

CONCLUSION:     

Uncomplicated ultrasound and fluoroscopic guided implanted central venous port catheter placement as 
described in detail above.  An 8 American Power port was placed.

 

 

 

 Christiano Marroquin MD on January 24, 2018 at 8:53           

Board Certified Radiologist.

 This report was verified electronically.

## 2018-01-24 NOTE — PD.RAD
Post Procedure Progress Note


Pre Procedure Diagnosis:  


(1) Gastric mass


Post Procedure Diagnosis:  


(1) Gastric mass


Procedure Date:


Jan 24, 2018


Supervising Radiologist:


Christiano Marroquin


Proceduralist/Assist:  Amaury Francis, RT(R), Jasmin Mcguire RT(R)


Anesthesia:  Conscious Sedation


Plan of Activity


Patient to Unit:  [facility]


Patient Condition:  Good


See PACS Report for procedural detail/treatment











Christiano Marroquin MD Jan 24, 2018 08:38

## 2018-05-08 ENCOUNTER — HOSPITAL ENCOUNTER (INPATIENT)
Dept: HOSPITAL 17 - HSDI | Age: 78
LOS: 20 days | Discharge: HOME | DRG: 327 | End: 2018-05-28
Attending: SURGERY | Admitting: SURGERY
Payer: COMMERCIAL

## 2018-05-08 VITALS — WEIGHT: 147.71 LBS | BODY MASS INDEX: 21.88 KG/M2 | HEIGHT: 69 IN

## 2018-05-08 DIAGNOSIS — D63.8: ICD-10-CM

## 2018-05-08 DIAGNOSIS — I10: ICD-10-CM

## 2018-05-08 DIAGNOSIS — Z87.891: ICD-10-CM

## 2018-05-08 DIAGNOSIS — Z23: ICD-10-CM

## 2018-05-08 DIAGNOSIS — K59.00: ICD-10-CM

## 2018-05-08 DIAGNOSIS — D50.9: ICD-10-CM

## 2018-05-08 DIAGNOSIS — C78.6: ICD-10-CM

## 2018-05-08 DIAGNOSIS — C16.9: Primary | ICD-10-CM

## 2018-05-08 DIAGNOSIS — Z92.21: ICD-10-CM

## 2018-05-08 DIAGNOSIS — M19.90: ICD-10-CM

## 2018-05-08 DIAGNOSIS — C78.7: ICD-10-CM

## 2018-05-08 DIAGNOSIS — E78.5: ICD-10-CM

## 2018-05-08 DIAGNOSIS — D64.9: ICD-10-CM

## 2018-05-08 PROCEDURE — 87641 MR-STAPH DNA AMP PROBE: CPT

## 2018-05-08 PROCEDURE — 88305 TISSUE EXAM BY PATHOLOGIST: CPT

## 2018-05-08 PROCEDURE — 90732 PPSV23 VACC 2 YRS+ SUBQ/IM: CPT

## 2018-05-08 PROCEDURE — 85025 COMPLETE CBC W/AUTO DIFF WBC: CPT

## 2018-05-08 PROCEDURE — 88309 TISSUE EXAM BY PATHOLOGIST: CPT

## 2018-05-08 PROCEDURE — 94150 VITAL CAPACITY TEST: CPT

## 2018-05-08 PROCEDURE — 86900 BLOOD TYPING SEROLOGIC ABO: CPT

## 2018-05-08 PROCEDURE — 86850 RBC ANTIBODY SCREEN: CPT

## 2018-05-08 PROCEDURE — 85027 COMPLETE CBC AUTOMATED: CPT

## 2018-05-08 PROCEDURE — 88307 TISSUE EXAM BY PATHOLOGIST: CPT

## 2018-05-08 PROCEDURE — 80048 BASIC METABOLIC PNL TOTAL CA: CPT

## 2018-05-08 PROCEDURE — 86901 BLOOD TYPING SEROLOGIC RH(D): CPT

## 2018-05-08 PROCEDURE — 85007 BL SMEAR W/DIFF WBC COUNT: CPT

## 2018-05-08 PROCEDURE — 86920 COMPATIBILITY TEST SPIN: CPT

## 2018-05-08 PROCEDURE — 76937 US GUIDE VASCULAR ACCESS: CPT

## 2018-05-17 VITALS
TEMPERATURE: 99 F | HEART RATE: 93 BPM | DIASTOLIC BLOOD PRESSURE: 68 MMHG | SYSTOLIC BLOOD PRESSURE: 147 MMHG | RESPIRATION RATE: 16 BRPM | OXYGEN SATURATION: 99 %

## 2018-05-17 VITALS
DIASTOLIC BLOOD PRESSURE: 63 MMHG | RESPIRATION RATE: 17 BRPM | SYSTOLIC BLOOD PRESSURE: 130 MMHG | TEMPERATURE: 98.8 F | HEART RATE: 90 BPM | OXYGEN SATURATION: 99 %

## 2018-05-17 VITALS — HEART RATE: 88 BPM

## 2018-05-17 LAB
BASOPHILS # BLD AUTO: 0.1 TH/MM3 (ref 0–0.2)
BASOPHILS NFR BLD: 0.9 % (ref 0–2)
BUN SERPL-MCNC: 16 MG/DL (ref 7–18)
CALCIUM SERPL-MCNC: 9.8 MG/DL (ref 8.5–10.1)
CHLORIDE SERPL-SCNC: 101 MEQ/L (ref 98–107)
CREAT SERPL-MCNC: 1.19 MG/DL (ref 0.6–1.3)
EOSINOPHIL # BLD: 0.1 TH/MM3 (ref 0–0.4)
EOSINOPHIL NFR BLD: 1.6 % (ref 0–4)
ERYTHROCYTE [DISTWIDTH] IN BLOOD BY AUTOMATED COUNT: 18.2 % (ref 11.6–17.2)
GFR SERPLBLD BASED ON 1.73 SQ M-ARVRAT: 59 ML/MIN (ref 89–?)
GLUCOSE SERPL-MCNC: 93 MG/DL (ref 74–106)
HCO3 BLD-SCNC: 27.5 MEQ/L (ref 21–32)
HCT VFR BLD CALC: 40.7 % (ref 39–51)
HGB BLD-MCNC: 13.9 GM/DL (ref 13–17)
LYMPHOCYTES # BLD AUTO: 1.3 TH/MM3 (ref 1–4.8)
LYMPHOCYTES NFR BLD AUTO: 19 % (ref 9–44)
MCH RBC QN AUTO: 28.8 PG (ref 27–34)
MCHC RBC AUTO-ENTMCNC: 34.2 % (ref 32–36)
MCV RBC AUTO: 84.1 FL (ref 80–100)
MONOCYTE #: 0.7 TH/MM3 (ref 0–0.9)
MONOCYTES NFR BLD: 9.9 % (ref 0–8)
NEUTROPHILS # BLD AUTO: 4.7 TH/MM3 (ref 1.8–7.7)
NEUTROPHILS NFR BLD AUTO: 68.6 % (ref 16–70)
OVALOCYTES BLD QL SMEAR: (no result)
PLATELET # BLD: 173 TH/MM3 (ref 150–450)
PMV BLD AUTO: 8.3 FL (ref 7–11)
RBC # BLD AUTO: 4.83 MIL/MM3 (ref 4.5–5.9)
SODIUM SERPL-SCNC: 140 MEQ/L (ref 136–145)
WBC # BLD AUTO: 6.8 TH/MM3 (ref 4–11)

## 2018-05-17 PROCEDURE — 0DHA0UZ INSERTION OF FEEDING DEVICE INTO JEJUNUM, OPEN APPROACH: ICD-10-PCS | Performed by: SURGERY

## 2018-05-17 PROCEDURE — 0DQV0ZZ REPAIR MESENTERY, OPEN APPROACH: ICD-10-PCS | Performed by: SURGERY

## 2018-05-17 PROCEDURE — 07BB0ZX EXCISION OF MESENTERIC LYMPHATIC, OPEN APPROACH, DIAGNOSTIC: ICD-10-PCS | Performed by: SURGERY

## 2018-05-17 PROCEDURE — 0DB60ZZ EXCISION OF STOMACH, OPEN APPROACH: ICD-10-PCS | Performed by: SURGERY

## 2018-05-17 PROCEDURE — 0DBV0ZZ EXCISION OF MESENTERY, OPEN APPROACH: ICD-10-PCS | Performed by: SURGERY

## 2018-05-17 PROCEDURE — 3E0T3BZ INTRODUCTION OF ANESTHETIC AGENT INTO PERIPHERAL NERVES AND PLEXI, PERCUTANEOUS APPROACH: ICD-10-PCS | Performed by: ANESTHESIOLOGY

## 2018-05-17 PROCEDURE — 0FB20ZX EXCISION OF LEFT LOBE LIVER, OPEN APPROACH, DIAGNOSTIC: ICD-10-PCS | Performed by: SURGERY

## 2018-05-17 RX ADMIN — Medication SCH ML: at 21:00

## 2018-05-17 RX ADMIN — OXYTOCIN SCH MLS/HR: 10 INJECTION, SOLUTION INTRAMUSCULAR; INTRAVENOUS at 21:55

## 2018-05-17 RX ADMIN — MORPHINE SULFATE SCH MG: 1 INJECTION, SOLUTION INTRAVENOUS at 16:30

## 2018-05-17 RX ADMIN — OXYTOCIN SCH MLS/HR: 10 INJECTION, SOLUTION INTRAMUSCULAR; INTRAVENOUS at 16:05

## 2018-05-17 RX ADMIN — CEFAZOLIN SODIUM SCH MLS/HR: 2 SOLUTION INTRAVENOUS at 11:25

## 2018-05-17 RX ADMIN — SODIUM CHLORIDE SCH MLS/HR: 900 INJECTION, SOLUTION INTRAVENOUS at 21:50

## 2018-05-17 RX ADMIN — ACETAMINOPHEN SCH MLS/HR: 10 INJECTION, SOLUTION INTRAVENOUS at 18:05

## 2018-05-17 RX ADMIN — CEFAZOLIN SODIUM SCH MLS/HR: 2 SOLUTION INTRAVENOUS at 13:04

## 2018-05-17 NOTE — PD.CONS
[facility] [place]


Service


Critical Care Medicine


Consult Requested By


Dr. Wild


Reason for Consult


perioperative management of medical comorbidities


Primary Care Physician


ASTON Licea Do, MD


History of Present Illness


This is a 78-year-old male with a history of gastric cancer who has undergone 

neoadjuvant chemotherapy and presents for planned total gastrectomy.  

Intraoperative course was complicated by the discovery of widely metastatic 

disease within the abdomen.  The total gastrectomy with Izabel-en-Y 

gastrojejunostomy and jejunojejunostomy was completed without complication.  

The patient arrived to PACU in stable and exudative condition.  I evaluated the 

patient in the PACU.  The patient is arousing from anesthesia and a full 

history and review systems is unobtainable.  A brief review of systems is 

negative for throat pain, headache, nausea, vomiting.  He does endorse mild 

tolerable abdominal pain.  He received 3 L of crystalloid intraoperatively.  

Urine output was adequate.  Critical care medicine has been consulted to 

evaluate and manage his medical comorbidities as the setting of a high risk 

oncologic surgical procedure.





Review of Systems


ROS Limitations:  Clinical Condition, Altered Mental Status


Ears, nose, mouth, throat:  DENIES: Throat pain


Respiratory:  DENIES: Cough, Shortness of breath


Cardiovascular:  DENIES: Chest pain


Gastrointestinal:  DENIES: Nausea, Vomiting


ROS


Arousing from anesthesia





Past Family Social History


Allergies:  


Coded Allergies:  


     No Known Allergies (Unverified  Allergy, Unknown, 1/24/18)


Past Medical History


Hypertension


Dyslipidemia


Gastric cancer


Anemia of unknown etiology


Osteoarthritis


Past Surgical History


Port placement


Reported Medications


Omeprazole 20 Mg Tab 20 Mg PO DAILY


Lovastatin 40 Mg Tab 40 Mg PO DAILY


Lisinopril-Hctz 20-25 Mg Tab 1 Tab PO DAILY


Feosol (Ferrous Sulfate) 325 Mg (65 Mg Iron) Tab 1 Tab PO BID


Latanoprost Opth Drops (Latanoprost) 0.005% Drops 1 Drop EACH EYE HS


     Refrigerate until opened.


Active Ordered Medications


See MAR


Family History


Reviewed and found to be noncontributory to his acute illness


Social History


1 pack per day smoker 5 years, but quit 45 years ago.





Physical Exam


Vital Signs





Vital Signs








  Date Time  Temp Pulse Resp B/P (MAP) Pulse Ox O2 Delivery O2 Flow Rate FiO2


 


5/17/18 20:20   16     


 


5/17/18 18:35   20     


 


5/17/18 18:30     98 Nasal Cannula 2.00 


 


5/17/18 18:30 99.0 93 16 147/68 (94) 99   


 


5/17/18 18:30  100      


 


5/17/18 18:00  92 16 127/64 (85) 100 Nasal Cannula 2 


 


5/17/18 17:00  93 15 124/62 (82) 100 Nasal Cannula 2 


 


5/17/18 16:30   15     


 


5/17/18 16:15 99.1 93 18 119/57 (77) 100 Nasal Cannula 2 


 


5/17/18 16:00  92 16 120/59 (79) 100 Nasal Cannula 3 


 


5/17/18 15:45  89 17 121/61 (81) 100 Nasal Cannula 3 


 


5/17/18 15:30  91 16 125/59 (81) 100 Nasal Cannula 3 


 


5/17/18 15:15  86 22 131/58 (82) 100 Nasal Cannula 3 


 


5/17/18 15:04 99.2 93 16 124/74 (91) 100 Simple Mask 6 


 


5/17/18 09:37 98.3 92 16 163/92 (115) 100   








Physical Exam


GENERAL: This is a elderly male, lying in bed, arousing from anesthesia


HEENT: Normocephalic.  Atraumatic.  Pupils equal, round, reactive, conjugate.  

Mucous membranes are moist


NECK: Trachea is midline.  There is no JVD.  There is a left subclavian central 

venous catheter with its dressing intact, site is clean and dry.


CHEST: Equal chest rise.  Nasal cannula oxygen.  Unlabored.


CARDIOVASCULAR: Normal rate, regular rhythm.  Sinus by telemetry.


ABDOMEN: There is a midline abdominal incision with a dressing which is clean 

and dry.  There is a JOANNE drain that exits the abdomen which has a minimal amount 

of sanguinous output.  There is a J-tube which is to gravity without 

significant output.  The abdomen is soft, mildly and appropriately tender to 

palpation, nondistended.  No guarding or rebound.  


MUSCULOSKELETAL: Pulses 2+.  No peripheral edema.


NEUROLOGICAL: RASS -2.  Arousing from anesthesia.  Will awaken follow commands.

  No focal deficits.


Laboratory





Laboratory Tests








Test


  5/17/18


09:25


 


White Blood Count 6.8 


 


Red Blood Count 4.83 


 


Hemoglobin 13.9 


 


Hematocrit 40.7 


 


Mean Corpuscular Volume 84.1 


 


Mean Corpuscular Hemoglobin 28.8 


 


Mean Corpuscular Hemoglobin


Concent 34.2 


 


 


Red Cell Distribution Width 18.2 


 


Platelet Count 173 


 


Mean Platelet Volume 8.3 


 


Neutrophils (%) (Auto) 68.6 


 


Lymphocytes (%) (Auto) 19.0 


 


Monocytes (%) (Auto) 9.9 


 


Eosinophils (%) (Auto) 1.6 


 


Basophils (%) (Auto) 0.9 


 


Neutrophils # (Auto) 4.7 


 


Lymphocytes # (Auto) 1.3 


 


Monocytes # (Auto) 0.7 


 


Eosinophils # (Auto) 0.1 


 


Basophils # (Auto) 0.1 


 


CBC Comment AUTO DIFF 


 


Differential Comment


  AUTO DIFF


CONFIRMED


 


Ovalocytes 1+ 


 


Blood Urea Nitrogen 16 


 


Creatinine 1.19 


 


Random Glucose 93 


 


Calcium Level 9.8 


 


Sodium Level 140 


 


Potassium Level 3.5 


 


Chloride Level 101 


 


Carbon Dioxide Level 27.5 


 


Anion Gap 12 


 


Estimat Glomerular Filtration


Rate 59 


 








Result Diagram:  


5/17/18 0925                                                                   

             5/17/18 0925








Assessment and Plan


Assessment and Plan


Assessment: 78-year-old male postop day 0 status post laparotomy with total 

gastrectomy and Izabel-en-Y gastrojejunostomy, jejunojejunostomy.  Admit to ICU.  

Close monitoring.  Maintenance fluids.  Pain control per surgeon.  High risk 

given age and comorbidities.  We will continue to follow along.





s/p total gastrectomy and Izabel-en-Y gastrojejunostomy, jejunojejunostomy 5/17


- mivf


- pain control per surgeon


- NPO


- await ROBF


- J tube to gravity





HTN


- hold antihypertensives currently


- will add back as needed





Hyperlipidemia


- hold statin given NPO. will need to restart this as soon as we begin to use 

enteral access





Anemia, unknown cause, most likely chronic disease superimposed on iron 

deficiency.


- does not meet transfusion triggers at this time


- daily cbc





daily [facility]


ICU electrolyte protocol





SCDs, pharmacologic dvt prophylaxis when cleared by surgery





Admit to ICU. Critical care medicine will follow along.











Meliton Verdin MD May 17, 2018 21:17

## 2018-05-17 NOTE — MP
cc:

Timmy Duvall MD

****

 

 

DATE OF OPERATION:

05/17/2018

 

DATE OF PROCEDURE:

05/17/2018

 

PREOPERATIVE DIAGNOSIS:

Gastric adenocarcinoma, status post neoadjuvant chemotherapy.

 

POSTOPERATIVE DIAGNOSIS:

Gastric adenocarcinoma, status post neoadjuvant chemotherapy.

 

PROCEDURE PERFORMED:

1.  Open subtotal gastrectomy (palliative gastrectomy) with en bloc 

resection of the transverse colon mesentery with Izabel-en-Y 

reconstruction.

2.  Open jejunostomy tube placement.

3.  Incisional liver biopsy.

 

ATTENDING SURGEON:

Timmy Duvall MD

 

ASSISTANT:

Staff.

 

ANESTHESIA:

General, regional tap block.

 

BLOOD LOSS:

150 mL.

 

COMPLICATIONS:

None.

 

FINDINGS:

1.  A very large bulky locally advanced tumor involving the greater 

curve of the stomach nearly obstructing at the level of the incisura, 

with extensive involvement in the transverse mesocolon but with 

sparing of the middle colic artery.

2.  Left lobe of the liver metastasis.

3.  Multiple omental nodules consistent with peritoneal metastasis.

4.  Multiple bulky D1 and D2 lymph nodes consistent with lymph node 

positive locally advanced disease.

5.  No carcinomatosis.

 

INDICATIONS FOR PROCEDURE:

The patient is a 78-year-old male who was recently diagnosed with 

gastric adenocarcinoma.  The patient had no evidence of metastatic 

disease on staging scans.  The patient underwent neoadjuvant 

chemotherapy for planned perioperative chemotherapy for potential 

curative resection.  The risks, benefits and alternatives to subtotal 

versus total gastrectomy were discussed with the patient and the 

family in detail prior to the procedure and the patient agreed to 

undergo the procedure.

 

DESCRIPTION OF PROCEDURE:

The patient was taken to the operating room and underwent a regional 

tap block after anesthesia per anesthesia documentation.  We then 

shaved, prepped and draped the abdomen in a sterile fashion.  Timeout 

was performed.  Abdomen was entered through an upper midline incision 

with a 10 blade scalpel.  Bovie electrocautery was used to dissect 

through the subcutaneous tissue and open the midline fascia for the 

full length of the incision.  We placed an Joaquin extra large wound 

retractor into the wound.  We placed a Bookwalter retractor to gain 

better exposure.  At this point in time, I explored the abdomen.  

There was a large bulky locally advanced tumor involving the 

transverse mesocolon as well as omental nodules as far as 

oligometastasis in the omentum as well as a 1.5 cm size metastasis on 

the underside of the left lobe of the liver.  At this point in time, 

after careful inspection of this tumor, I could tell that this was 

basically involving almost the complete circumferential lumen around 

the incisura and to the prepyloric area and looked like an impending 

obstruction.  The patient was also having significant symptoms from 

the tumor.  I felt that although this patient was not curable, based 

on his extensive disease, that a palliative gastrectomy would be 

indicated due to impending obstruction.  I did also discuss this with 

the patient and the family prior to surgery as this scenario would be 

possible with palliative gastrectomy being a potential benefit to the 

patient.  I did proceed with palliative gastrectomy.  I did remove the

omentum off the transverse colon using the Enseal device.  We followed

this down to the root of the mesentery and exposed the transverse 

mesocolon.  We dissected around the pylorus 360 degrees using the 

right angle retractor.  We divided this with a green load on the 

[place]on stapler.  We continued our dissection proximally towards the 

stomach and we were able to identify the middle colic artery which was

very close to, but not involved in the exophytic portion of the tumor 

of the greater curve.  We dissected this off of the tumor gently with 

a right angle and this again was not involved with tumor and was 

completely intact with a good pulse.  We continued our dissection and 

using the Enseal to take the remaining involved portion of the 

mesocolon.  There was approximately 5 cm of transverse mesocolon that 

was involved in the tumor and this again was resected with the 

resection of the middle colic artery.  The colon remained uninvolved 

by tumor and pink and viable after this resection.  We continued our 

dissection, took down about half of the short gastrics up to the 

spleen.  There was a small amount of oozing from a small capsular tear

just a few millimeters.  This was stopped quite easily with some 

Surgicel SNoW.  We were then able to use multiple green loads on the 

Reston stapler to divide the stomach at the fundus down to above the 

level of the incisura.  This freed the stomach up from the patient 

with the exception of some branches of the left gastric artery.  We 

did place a white load on the Reston stapler to divide these 

branches.  We ensured we had a good pulse in the hepatic artery.  We 

passed off the specimen as subtotal gastrectomy.  All staple lines 

were intact and pink and viable. We did take a scalpel and do a wedge 

incisional biopsy type of the deposit in the left lobe of the liver to

document stage IV disease.  We had excellent hemostasis with Bovie 

cautery after this biopsy.  We at this point in time then closed the 

defect in our transverse mesentery with a running 3-0 Vicryl suture.  

We then turned our attention towards reconstruction.  We performed an 

antecolic retrogastric Izabel-en-Y reconstruction.  We divided the 

jejunum approximately 40 cm past the ligament of Treitz and brought 

the Izabel limb, again antecolic retrogastric and performed a 

side-to-side anastomosis with our gastric pouch and the small bowel 

using the green load on the Reston stapler.  The staple defect was 

closed with interrupted 3-0 silk sutures.  We then placed an NG tube 

tip down to the anastomosis and secured this in position.  We then 

performed our JJ anastomosis using a blue load on the Reston stapler.

 Staple line defect was closed with a second blue load on the Reston 

stapler.  Staple lines again were intact and pink and viable with no 

leak and no bleeding.  The mesenteric defect was closed with 

interrupted 3-0 silk suture.  We oversewed the crotch staple lines of 

the gastrojejunostomy as well as the jejunojejunostomy.  We then 

placed a J-tube in Sang fashion approximately 30 cm past our JJ using

a 14-Fijian SARAH jejunostomy tube.  This stands up to the abdominal 

wall with 3-0 silks as well as a 3-0 silk pursestring around the base 

of the tube.  This was flushed and 2.5 mL of water was placed in the 

balloon.  This was sutured to the skin with nylon sutures as well.  We

then placed a 19-Fijian round Trevor drain through a separate stab 

incision in the right upper quadrant and laid this up in the 

epigastric area near our gastrojejunostomy anastomosis as well as 

duodenal stump.  We irrigated out the abdomen with 2 liters of sterile

saline until all suctioning was clear.  We turned our attention 

towards closure.  We closed the midline fascia with a single #1 looped

PDS suture.  We closed the skin with staples and a MELINDA dressing was 

applied.  A drain was placed to bulb suction and J-tube was placed to 

gravity drain.  The patient was at this point in time discontinued 

from the anesthesia and taken the PACU in stable condition.  The 

patient tolerated the procedure well.  There were no apparent 

complications.  All counts were correct.  I was present and performed 

the entire procedure.

 

 

__________________________________

MD JOYCE Ardon/MUSTAPHA

D: 05/17/2018, 03:48 PM

T: 05/17/2018, 04:35 PM

Visit #: [id number]

Job #: [id number]

## 2018-05-18 VITALS
DIASTOLIC BLOOD PRESSURE: 64 MMHG | SYSTOLIC BLOOD PRESSURE: 146 MMHG | TEMPERATURE: 99.7 F | RESPIRATION RATE: 20 BRPM | HEART RATE: 100 BPM | OXYGEN SATURATION: 96 %

## 2018-05-18 VITALS
OXYGEN SATURATION: 99 % | RESPIRATION RATE: 15 BRPM | SYSTOLIC BLOOD PRESSURE: 116 MMHG | DIASTOLIC BLOOD PRESSURE: 62 MMHG | HEART RATE: 88 BPM | TEMPERATURE: 99.2 F

## 2018-05-18 VITALS
SYSTOLIC BLOOD PRESSURE: 124 MMHG | HEART RATE: 83 BPM | TEMPERATURE: 98.9 F | DIASTOLIC BLOOD PRESSURE: 64 MMHG | OXYGEN SATURATION: 99 % | RESPIRATION RATE: 15 BRPM

## 2018-05-18 VITALS
TEMPERATURE: 99.1 F | OXYGEN SATURATION: 99 % | RESPIRATION RATE: 23 BRPM | DIASTOLIC BLOOD PRESSURE: 58 MMHG | HEART RATE: 96 BPM | SYSTOLIC BLOOD PRESSURE: 123 MMHG

## 2018-05-18 VITALS — HEART RATE: 90 BPM

## 2018-05-18 VITALS — HEART RATE: 92 BPM

## 2018-05-18 VITALS
TEMPERATURE: 99.4 F | SYSTOLIC BLOOD PRESSURE: 139 MMHG | DIASTOLIC BLOOD PRESSURE: 67 MMHG | RESPIRATION RATE: 16 BRPM | HEART RATE: 96 BPM | OXYGEN SATURATION: 100 %

## 2018-05-18 VITALS
RESPIRATION RATE: 16 BRPM | OXYGEN SATURATION: 99 % | TEMPERATURE: 99 F | SYSTOLIC BLOOD PRESSURE: 150 MMHG | HEART RATE: 99 BPM | DIASTOLIC BLOOD PRESSURE: 67 MMHG

## 2018-05-18 VITALS — OXYGEN SATURATION: 98 %

## 2018-05-18 VITALS — HEART RATE: 88 BPM

## 2018-05-18 LAB
BASOPHILS # BLD AUTO: 0 TH/MM3 (ref 0–0.2)
BASOPHILS NFR BLD: 0.1 % (ref 0–2)
BUN SERPL-MCNC: 19 MG/DL (ref 7–18)
CALCIUM SERPL-MCNC: 8.6 MG/DL (ref 8.5–10.1)
CHLORIDE SERPL-SCNC: 103 MEQ/L (ref 98–107)
CREAT SERPL-MCNC: 1.17 MG/DL (ref 0.6–1.3)
EOSINOPHIL # BLD: 0 TH/MM3 (ref 0–0.4)
EOSINOPHIL NFR BLD: 0 % (ref 0–4)
ERYTHROCYTE [DISTWIDTH] IN BLOOD BY AUTOMATED COUNT: 18.4 % (ref 11.6–17.2)
GFR SERPLBLD BASED ON 1.73 SQ M-ARVRAT: 60 ML/MIN (ref 89–?)
GLUCOSE SERPL-MCNC: 165 MG/DL (ref 74–106)
HCO3 BLD-SCNC: 26.1 MEQ/L (ref 21–32)
HCT VFR BLD CALC: 34.4 % (ref 39–51)
HGB BLD-MCNC: 12 GM/DL (ref 13–17)
LYMPHOCYTES # BLD AUTO: 0.4 TH/MM3 (ref 1–4.8)
LYMPHOCYTES NFR BLD AUTO: 3.4 % (ref 9–44)
LYMPHOCYTES: 4 % (ref 9–44)
MCH RBC QN AUTO: 29.3 PG (ref 27–34)
MCHC RBC AUTO-ENTMCNC: 35 % (ref 32–36)
MCV RBC AUTO: 83.6 FL (ref 80–100)
MONOCYTE #: 0.5 TH/MM3 (ref 0–0.9)
MONOCYTES NFR BLD: 4.9 % (ref 0–8)
MONOCYTES: 5 % (ref 0–8)
NEUTROPHILS # BLD AUTO: 9.6 TH/MM3 (ref 1.8–7.7)
NEUTROPHILS NFR BLD AUTO: 91.6 % (ref 16–70)
NEUTS BAND # BLD MANUAL: 9.5 TH/MM3 (ref 1.8–7.7)
NEUTS BAND NFR BLD: 13 % (ref 0–6)
NEUTS SEG NFR BLD MANUAL: 78 % (ref 16–70)
OVALOCYTES BLD QL SMEAR: (no result)
PLATELET # BLD: 140 TH/MM3 (ref 150–450)
PMV BLD AUTO: 7.4 FL (ref 7–11)
RBC # BLD AUTO: 4.11 MIL/MM3 (ref 4.5–5.9)
SODIUM SERPL-SCNC: 139 MEQ/L (ref 136–145)
WBC # BLD AUTO: 10.4 TH/MM3 (ref 4–11)

## 2018-05-18 RX ADMIN — Medication SCH ML: at 09:00

## 2018-05-18 RX ADMIN — MORPHINE SULFATE SCH MG: 1 INJECTION, SOLUTION INTRAVENOUS at 12:31

## 2018-05-18 RX ADMIN — Medication SCH ML: at 21:00

## 2018-05-18 RX ADMIN — ACETAMINOPHEN SCH MLS/HR: 10 INJECTION, SOLUTION INTRAVENOUS at 12:32

## 2018-05-18 RX ADMIN — ACETAMINOPHEN SCH MLS/HR: 10 INJECTION, SOLUTION INTRAVENOUS at 05:00

## 2018-05-18 RX ADMIN — OXYTOCIN SCH MLS/HR: 10 INJECTION, SOLUTION INTRAMUSCULAR; INTRAVENOUS at 12:30

## 2018-05-18 RX ADMIN — ENOXAPARIN SODIUM SCH MG: 40 INJECTION SUBCUTANEOUS at 14:00

## 2018-05-18 RX ADMIN — ACETAMINOPHEN SCH MLS/HR: 10 INJECTION, SOLUTION INTRAVENOUS at 18:40

## 2018-05-18 RX ADMIN — OXYTOCIN SCH MLS/HR: 10 INJECTION, SOLUTION INTRAMUSCULAR; INTRAVENOUS at 21:25

## 2018-05-18 RX ADMIN — SODIUM CHLORIDE SCH MLS/HR: 900 INJECTION, SOLUTION INTRAVENOUS at 04:58

## 2018-05-18 NOTE — HHI.PR
__________________________________________________





Subjective


Subjective Notes


feels well, pain controlled





Objective


Vitals/I&O





Vital Signs








  Date Time  Temp Pulse Resp B/P (MAP) Pulse Ox O2 Delivery O2 Flow Rate FiO2


 


5/18/18 13:47   18     


 


5/18/18 08:33     98   21


 


5/18/18 06:00  92      


 


5/18/18 04:00 99.2   116/62 (80)    


 


5/17/18 21:30      Nasal Cannula 2.00 








Labs





Laboratory Tests








Test


  5/17/18


21:49 5/18/18


02:59


 


Nasal Screen MRSA (PCR)


  MRSA NOT


DETECTED 


 


 


White Blood Count  10.4 


 


Red Blood Count  4.11 


 


Hemoglobin  12.0 


 


Hematocrit  34.4 


 


Mean Corpuscular Volume  83.6 


 


Mean Corpuscular Hemoglobin  29.3 


 


Mean Corpuscular Hemoglobin


Concent 


  35.0 


 


 


Red Cell Distribution Width  18.4 


 


Platelet Count  140 


 


Mean Platelet Volume  7.4 


 


Neutrophils (%) (Auto)  91.6 


 


Lymphocytes (%) (Auto)  3.4 


 


Monocytes (%) (Auto)  4.9 


 


Eosinophils (%) (Auto)  0.0 


 


Basophils (%) (Auto)  0.1 


 


Neutrophils # (Auto)  9.6 


 


Lymphocytes # (Auto)  0.4 


 


Monocytes # (Auto)  0.5 


 


Eosinophils # (Auto)  0.0 


 


Basophils # (Auto)  0.0 


 


CBC Comment  AUTO DIFF 


 


Differential Total Cells


Counted 


  100 


 


 


Neutrophils % (Manual)  78 


 


Band Neutrophils %  13 


 


Lymphocytes %  4 


 


Monocytes %  5 


 


Neutrophils # (Manual)  9.5 


 


Differential Comment


  


  FINAL DIFF


MANUAL


 


Platelet Estimate  LOW 


 


Platelet Morphology Comment  NORMAL 


 


Ovalocytes  1+ 


 


Blood Urea Nitrogen  19 


 


Creatinine  1.17 


 


Random Glucose  165 


 


Calcium Level  8.6 


 


Sodium Level  139 


 


Potassium Level  3.8 


 


Chloride Level  103 


 


Carbon Dioxide Level  26.1 


 


Anion Gap  10 


 


Estimat Glomerular Filtration


Rate 


  60 


 








Cardiovascular:  Regular


Lungs:  Clear


Abdomen:  Non-distended, Post-op tenderness


Extremities:  No edema





A/P


Assessment and Plan


79yo male s/p subtotal gastrectomy, stable.





- stable postop, appreciate intensivist help


- pain ok


- keep NG, await bowel function


- d/w patient, has stage IV disease (small volume)











Timmy Duvall MD May 18, 2018 16:02

## 2018-05-18 NOTE — HHI.CCPN
Subjective


Remarks/Hospital Course


Hospital Course:





This is a 78-year-old male with a history of gastric cancer who has undergone 

neoadjuvant chemotherapy and presents for planned total gastrectomy.  

Intraoperative course was complicated by the discovery of widely metastatic 

disease within the abdomen.  The total gastrectomy with Izabel-en-Y 

gastrojejunostomy and jejunojejunostomy was completed without complication.  

The patient arrived to PACU in stable and exudative condition.  I evaluated the 

patient in the PACU.  The patient is arousing from anesthesia and a full 

history and review systems is unobtainable.  A brief review of systems is 

negative for throat pain, headache, nausea, vomiting.  He does endorse mild 

tolerable abdominal pain.  He received 3 L of crystalloid intraoperatively.  

Urine output was adequate.  Critical care medicine has been consulted to 

evaluate and manage his medical comorbidities as the setting of a high risk 

oncologic surgical procedure.





Subjective:





5/18: denies complaints. states pain is adequately controlled. NPO. no bowel 

function yet. ROS negative.





Objective





Vital Signs








  Date Time  Temp Pulse Resp B/P (MAP) Pulse Ox O2 Delivery O2 Flow Rate FiO2


 


5/18/18 08:33     98   21


 


5/18/18 06:00  92      


 


5/18/18 06:00   20     


 


5/18/18 04:00 99.2   116/62 (80)    


 


5/17/18 21:30      Nasal Cannula 2.00 














Intake and Output   


 


 5/18/18 5/18/18 5/19/18





 08:00 16:00 00:00


 


Intake Total 0 ml  


 


Output Total 895 ml  


 


Balance -895 ml  








Result Diagram:  


5/18/18 0259                                                                   

             5/18/18 0259





Objective Remarks


GENERAL: This is a elderly male, lying in bed, no acute distress.


HEENT: Normocephalic.  Atraumatic.  Pupils equal, round, reactive, conjugate.  

Mucous membranes are moist


NECK: Trachea is midline.  There is no JVD.  There is a left subclavian central 

venous catheter with its dressing intact, site is clean and dry.


CHEST: Equal chest rise.  Nasal cannula oxygen.  Unlabored.


CARDIOVASCULAR: Normal rate, regular rhythm.  Sinus by telemetry.


ABDOMEN: There is a midline abdominal incision with a dressing which is clean 

and dry.  There is a JOANNE drain that exits the abdomen which has a minimal amount 

of serosanguineous output.  There is a J-tube which is to gravity without 

significant output.  The abdomen is soft, mildly and appropriately tender to 

palpation, nondistended.  No guarding or rebound.  


MUSCULOSKELETAL: Pulses 2+.  No peripheral edema.


NEUROLOGICAL: RASS 0. no focal deficits. follows commands.





A/P


Assessment and Plan


Assessment: 78-year-old male postop day 1 status post laparotomy with total 

gastrectomy and Izabel-en-Y gastrojejunostomy, jejunojejunostomy.  Maintenance 

fluids.  Pain control per surgeon.  discuss with surgeon: stable for transfer 

out of ICU. CCM will sign off.





s/p total gastrectomy and Izabel-en-Y gastrojejunostomy, jejunojejunostomy 5/17


- mivf


- pain control per surgeon


- NPO


- await ROBF


- J tube to gravity





HTN


- hold antihypertensives currently


- will add back as needed





Hyperlipidemia


- hold statin given NPO. will need to restart this as soon as we begin to use 

enteral access





Anemia, unknown cause, most likely chronic disease superimposed on iron 

deficiency.


- does not meet transfusion triggers at this time


- daily cbc





daily [facility]


ICU electrolyte protocol





SCDs, pharmacologic dvt prophylaxis when cleared by surgery











Meliton Verdin MD May 18, 2018 09:47

## 2018-05-19 VITALS
SYSTOLIC BLOOD PRESSURE: 156 MMHG | HEART RATE: 95 BPM | RESPIRATION RATE: 18 BRPM | TEMPERATURE: 99.4 F | OXYGEN SATURATION: 92 % | DIASTOLIC BLOOD PRESSURE: 75 MMHG

## 2018-05-19 VITALS
HEART RATE: 85 BPM | OXYGEN SATURATION: 95 % | TEMPERATURE: 98.5 F | DIASTOLIC BLOOD PRESSURE: 63 MMHG | RESPIRATION RATE: 16 BRPM | SYSTOLIC BLOOD PRESSURE: 141 MMHG

## 2018-05-19 VITALS
DIASTOLIC BLOOD PRESSURE: 82 MMHG | TEMPERATURE: 98.7 F | OXYGEN SATURATION: 92 % | SYSTOLIC BLOOD PRESSURE: 166 MMHG | HEART RATE: 103 BPM | RESPIRATION RATE: 18 BRPM

## 2018-05-19 VITALS
RESPIRATION RATE: 17 BRPM | DIASTOLIC BLOOD PRESSURE: 72 MMHG | SYSTOLIC BLOOD PRESSURE: 178 MMHG | OXYGEN SATURATION: 95 % | TEMPERATURE: 98.2 F | HEART RATE: 90 BPM

## 2018-05-19 VITALS
HEART RATE: 85 BPM | SYSTOLIC BLOOD PRESSURE: 127 MMHG | RESPIRATION RATE: 18 BRPM | TEMPERATURE: 99 F | OXYGEN SATURATION: 96 %

## 2018-05-19 VITALS
TEMPERATURE: 99.2 F | OXYGEN SATURATION: 96 % | HEART RATE: 94 BPM | RESPIRATION RATE: 18 BRPM | DIASTOLIC BLOOD PRESSURE: 60 MMHG | SYSTOLIC BLOOD PRESSURE: 118 MMHG

## 2018-05-19 VITALS — OXYGEN SATURATION: 94 %

## 2018-05-19 RX ADMIN — OXYTOCIN SCH MLS/HR: 10 INJECTION, SOLUTION INTRAMUSCULAR; INTRAVENOUS at 07:25

## 2018-05-19 RX ADMIN — Medication SCH ML: at 20:02

## 2018-05-19 RX ADMIN — ENOXAPARIN SODIUM SCH MG: 40 INJECTION SUBCUTANEOUS at 14:57

## 2018-05-19 RX ADMIN — ACETAMINOPHEN SCH MLS/HR: 10 INJECTION, SOLUTION INTRAVENOUS at 06:05

## 2018-05-19 RX ADMIN — MORPHINE SULFATE SCH MG: 1 INJECTION, SOLUTION INTRAVENOUS at 15:34

## 2018-05-19 RX ADMIN — Medication SCH ML: at 08:49

## 2018-05-19 RX ADMIN — ACETAMINOPHEN SCH MLS/HR: 10 INJECTION, SOLUTION INTRAVENOUS at 00:12

## 2018-05-19 RX ADMIN — MORPHINE SULFATE SCH MG: 1 INJECTION, SOLUTION INTRAVENOUS at 00:11

## 2018-05-19 RX ADMIN — ACETAMINOPHEN SCH MLS/HR: 10 INJECTION, SOLUTION INTRAVENOUS at 12:49

## 2018-05-19 RX ADMIN — OXYTOCIN SCH MLS/HR: 10 INJECTION, SOLUTION INTRAMUSCULAR; INTRAVENOUS at 17:11

## 2018-05-20 VITALS
TEMPERATURE: 100.2 F | DIASTOLIC BLOOD PRESSURE: 81 MMHG | RESPIRATION RATE: 18 BRPM | HEART RATE: 82 BPM | SYSTOLIC BLOOD PRESSURE: 147 MMHG | OXYGEN SATURATION: 96 %

## 2018-05-20 VITALS
RESPIRATION RATE: 18 BRPM | OXYGEN SATURATION: 95 % | TEMPERATURE: 99.9 F | DIASTOLIC BLOOD PRESSURE: 73 MMHG | HEART RATE: 88 BPM | SYSTOLIC BLOOD PRESSURE: 149 MMHG

## 2018-05-20 VITALS
HEART RATE: 99 BPM | SYSTOLIC BLOOD PRESSURE: 164 MMHG | RESPIRATION RATE: 18 BRPM | OXYGEN SATURATION: 96 % | TEMPERATURE: 100.9 F | DIASTOLIC BLOOD PRESSURE: 79 MMHG

## 2018-05-20 VITALS
SYSTOLIC BLOOD PRESSURE: 156 MMHG | DIASTOLIC BLOOD PRESSURE: 75 MMHG | RESPIRATION RATE: 18 BRPM | HEART RATE: 101 BPM | OXYGEN SATURATION: 95 % | TEMPERATURE: 99.6 F

## 2018-05-20 VITALS
DIASTOLIC BLOOD PRESSURE: 82 MMHG | SYSTOLIC BLOOD PRESSURE: 126 MMHG | RESPIRATION RATE: 18 BRPM | HEART RATE: 95 BPM | OXYGEN SATURATION: 95 % | TEMPERATURE: 99.8 F

## 2018-05-20 RX ADMIN — OXYTOCIN SCH MLS/HR: 10 INJECTION, SOLUTION INTRAMUSCULAR; INTRAVENOUS at 05:27

## 2018-05-20 RX ADMIN — OXYTOCIN SCH MLS/HR: 10 INJECTION, SOLUTION INTRAMUSCULAR; INTRAVENOUS at 23:25

## 2018-05-20 RX ADMIN — Medication SCH ML: at 09:00

## 2018-05-20 RX ADMIN — ENOXAPARIN SODIUM SCH MG: 40 INJECTION SUBCUTANEOUS at 12:04

## 2018-05-20 RX ADMIN — MORPHINE SULFATE SCH MG: 1 INJECTION, SOLUTION INTRAVENOUS at 09:02

## 2018-05-20 RX ADMIN — Medication SCH ML: at 20:31

## 2018-05-20 RX ADMIN — OXYTOCIN SCH MLS/HR: 10 INJECTION, SOLUTION INTRAMUSCULAR; INTRAVENOUS at 12:03

## 2018-05-20 RX ADMIN — MORPHINE SULFATE SCH MG: 1 INJECTION, SOLUTION INTRAVENOUS at 23:30

## 2018-05-20 NOTE — [email or web address].PR
__________________________________________________





Subjective


Subjective Notes


no issues, +flatus, oob





Objective


Vitals/I&O





Vital Signs








  Date Time  Temp Pulse Resp B/P (MAP) Pulse Ox O2 Delivery O2 Flow Rate FiO2


 


5/20/18 05:27   20     


 


5/20/18 00:00 99.9 88  149/73 (98) 95   


 


5/19/18 20:45      Room Air  


 


5/19/18 11:53        21


 


5/18/18 07:00       2.00 








Abdomen:  Non-tender (incisional tenderness, callum sxn serosang, j tube c/d/i nishi 

in place)





A/P


Assessment and Plan


77yo male POD#3 subtotal gastrectomy, stable.





- stable postop, OOB, pain controlled, wean pca


- callum sxn


- dvt ppx


- npo


- keep NG to decompress gastro-J anastomosis, await bowel function- consider 

clamp ng tube











Wei Kong MD May 20, 2018 08:22

## 2018-05-21 VITALS
TEMPERATURE: 99.1 F | DIASTOLIC BLOOD PRESSURE: 67 MMHG | OXYGEN SATURATION: 96 % | SYSTOLIC BLOOD PRESSURE: 153 MMHG | RESPIRATION RATE: 17 BRPM | HEART RATE: 90 BPM

## 2018-05-21 VITALS
TEMPERATURE: 98.7 F | HEART RATE: 91 BPM | SYSTOLIC BLOOD PRESSURE: 147 MMHG | DIASTOLIC BLOOD PRESSURE: 66 MMHG | OXYGEN SATURATION: 96 % | RESPIRATION RATE: 17 BRPM

## 2018-05-21 VITALS
SYSTOLIC BLOOD PRESSURE: 157 MMHG | OXYGEN SATURATION: 97 % | DIASTOLIC BLOOD PRESSURE: 76 MMHG | RESPIRATION RATE: 20 BRPM | TEMPERATURE: 99.4 F | HEART RATE: 93 BPM

## 2018-05-21 VITALS
SYSTOLIC BLOOD PRESSURE: 133 MMHG | RESPIRATION RATE: 17 BRPM | TEMPERATURE: 98.9 F | OXYGEN SATURATION: 96 % | HEART RATE: 96 BPM | DIASTOLIC BLOOD PRESSURE: 62 MMHG

## 2018-05-21 VITALS
SYSTOLIC BLOOD PRESSURE: 139 MMHG | RESPIRATION RATE: 18 BRPM | DIASTOLIC BLOOD PRESSURE: 71 MMHG | TEMPERATURE: 99.7 F | HEART RATE: 91 BPM | OXYGEN SATURATION: 96 %

## 2018-05-21 LAB
BASOPHILS # BLD AUTO: 0 TH/MM3 (ref 0–0.2)
BASOPHILS NFR BLD: 0.6 % (ref 0–2)
BUN SERPL-MCNC: 14 MG/DL (ref 7–18)
CALCIUM SERPL-MCNC: 8.4 MG/DL (ref 8.5–10.1)
CHLORIDE SERPL-SCNC: 100 MEQ/L (ref 98–107)
CREAT SERPL-MCNC: 0.57 MG/DL (ref 0.6–1.3)
EOSINOPHIL # BLD: 0.1 TH/MM3 (ref 0–0.4)
EOSINOPHIL NFR BLD: 1.3 % (ref 0–4)
ERYTHROCYTE [DISTWIDTH] IN BLOOD BY AUTOMATED COUNT: 16.7 % (ref 11.6–17.2)
GFR SERPLBLD BASED ON 1.73 SQ M-ARVRAT: 139 ML/MIN (ref 89–?)
GLUCOSE SERPL-MCNC: 71 MG/DL (ref 74–106)
HCO3 BLD-SCNC: 29.6 MEQ/L (ref 21–32)
HCT VFR BLD CALC: 34.9 % (ref 39–51)
HGB BLD-MCNC: 12 GM/DL (ref 13–17)
LYMPHOCYTES # BLD AUTO: 0.5 TH/MM3 (ref 1–4.8)
LYMPHOCYTES NFR BLD AUTO: 8.8 % (ref 9–44)
MCH RBC QN AUTO: 28.9 PG (ref 27–34)
MCHC RBC AUTO-ENTMCNC: 34.4 % (ref 32–36)
MCV RBC AUTO: 84.1 FL (ref 80–100)
MONOCYTE #: 0.7 TH/MM3 (ref 0–0.9)
MONOCYTES NFR BLD: 11 % (ref 0–8)
NEUTROPHILS # BLD AUTO: 4.8 TH/MM3 (ref 1.8–7.7)
NEUTROPHILS NFR BLD AUTO: 78.3 % (ref 16–70)
OVALOCYTES BLD QL SMEAR: (no result)
PLATELET # BLD: 155 TH/MM3 (ref 150–450)
PMV BLD AUTO: 7.1 FL (ref 7–11)
RBC # BLD AUTO: 4.15 MIL/MM3 (ref 4.5–5.9)
SODIUM SERPL-SCNC: 139 MEQ/L (ref 136–145)
WBC # BLD AUTO: 6.1 TH/MM3 (ref 4–11)

## 2018-05-21 RX ADMIN — Medication SCH ML: at 06:58

## 2018-05-21 RX ADMIN — OXYTOCIN SCH MLS/HR: 10 INJECTION, SOLUTION INTRAMUSCULAR; INTRAVENOUS at 21:05

## 2018-05-21 RX ADMIN — Medication SCH ML: at 21:05

## 2018-05-21 RX ADMIN — MORPHINE SULFATE SCH MG: 1 INJECTION, SOLUTION INTRAVENOUS at 16:28

## 2018-05-21 RX ADMIN — ENOXAPARIN SODIUM SCH MG: 40 INJECTION SUBCUTANEOUS at 14:03

## 2018-05-21 RX ADMIN — OXYTOCIN SCH MLS/HR: 10 INJECTION, SOLUTION INTRAMUSCULAR; INTRAVENOUS at 11:05

## 2018-05-21 NOTE — [email or web address].PR
__________________________________________________





Subjective


Subjective Notes


Doing well


No complains except NGT





Objective


Vitals/I&O





Vital Signs








  Date Time  Temp Pulse Resp B/P (MAP) Pulse Ox O2 Delivery O2 Flow Rate FiO2


 


5/21/18 12:00 99.1 90 17 153/67 (95) 96   


 


5/19/18 20:45      Room Air  


 


5/19/18 11:53        21


 


5/18/18 07:00       2.00 








Cardiovascular:  Regular


Lungs:  Clear


Abdomen:  Other (PCIO in place with molderate dried drainage; JOANNE SS drainage )


Extremities:  No edema





A/P


Assessment and Plan


78 year old male POD4 subtotal gastrectomy 


-


-Low grade fever overnight; will check labs today 


-NGT to LIWS; may consider clamping


-Will review labs once available


-PCA for pain


-Lovenox 


-PCA for pain control


-OOB


-IS





Attending Statement


The exam, history, and the medical decision-making described in the above note 

were completed with the assistance of the mid-level provider. I reviewed and 

agree with the findings presented.  I attest that I had a face-to-face 

encounter with the patient on the same day, and personally performed and 

documented my assessment and findings in the medical record.





stable postop, no signs of complications





await bowel function





increase tube feeds via j-tube slowly





OOB with PT











Florinda MontoyaP/First Assist ARNP May 21, 2018 14:45


Timmy Duvall MD May 23, 2018 12:17

## 2018-05-22 VITALS
RESPIRATION RATE: 17 BRPM | DIASTOLIC BLOOD PRESSURE: 74 MMHG | SYSTOLIC BLOOD PRESSURE: 171 MMHG | OXYGEN SATURATION: 97 % | HEART RATE: 85 BPM | TEMPERATURE: 97.8 F

## 2018-05-22 VITALS
SYSTOLIC BLOOD PRESSURE: 153 MMHG | DIASTOLIC BLOOD PRESSURE: 67 MMHG | HEART RATE: 82 BPM | TEMPERATURE: 98.6 F | OXYGEN SATURATION: 97 % | RESPIRATION RATE: 20 BRPM

## 2018-05-22 VITALS
OXYGEN SATURATION: 98 % | HEART RATE: 82 BPM | SYSTOLIC BLOOD PRESSURE: 142 MMHG | DIASTOLIC BLOOD PRESSURE: 82 MMHG | RESPIRATION RATE: 18 BRPM | TEMPERATURE: 98.5 F

## 2018-05-22 VITALS
DIASTOLIC BLOOD PRESSURE: 73 MMHG | OXYGEN SATURATION: 95 % | TEMPERATURE: 98 F | SYSTOLIC BLOOD PRESSURE: 159 MMHG | HEART RATE: 86 BPM | RESPIRATION RATE: 20 BRPM

## 2018-05-22 VITALS
HEART RATE: 92 BPM | OXYGEN SATURATION: 96 % | RESPIRATION RATE: 17 BRPM | TEMPERATURE: 98.9 F | SYSTOLIC BLOOD PRESSURE: 158 MMHG | DIASTOLIC BLOOD PRESSURE: 72 MMHG

## 2018-05-22 VITALS
SYSTOLIC BLOOD PRESSURE: 161 MMHG | OXYGEN SATURATION: 97 % | DIASTOLIC BLOOD PRESSURE: 79 MMHG | HEART RATE: 88 BPM | RESPIRATION RATE: 20 BRPM | TEMPERATURE: 98.9 F

## 2018-05-22 RX ADMIN — ENOXAPARIN SODIUM SCH MG: 40 INJECTION SUBCUTANEOUS at 15:35

## 2018-05-22 RX ADMIN — Medication SCH ML: at 20:57

## 2018-05-22 RX ADMIN — Medication SCH ML: at 09:00

## 2018-05-22 RX ADMIN — OXYTOCIN SCH MLS/HR: 10 INJECTION, SOLUTION INTRAMUSCULAR; INTRAVENOUS at 15:18

## 2018-05-22 RX ADMIN — OXYTOCIN SCH MLS/HR: 10 INJECTION, SOLUTION INTRAMUSCULAR; INTRAVENOUS at 05:02

## 2018-05-22 RX ADMIN — MORPHINE SULFATE SCH MG: 1 INJECTION, SOLUTION INTRAVENOUS at 15:57

## 2018-05-22 NOTE — [email or web address]
__________________________________________________





Subjective


Subjective Notes


Resting in bed 


No complains 


Wife at bedside 


Nervous about having Rausch removed





Objective


Vitals/I&O





Vital Signs








  Date Time  Temp Pulse Resp B/P (MAP) Pulse Ox O2 Delivery O2 Flow Rate FiO2


 


5/22/18 08:00 97.8 85 17 171/74 (106) 97   


 


5/19/18 20:45      Room Air  


 


5/19/18 11:53        21


 


5/18/18 07:00       2.00 








Labs





Laboratory Tests








Test


  5/21/18


14:24


 


White Blood Count 6.1 


 


Red Blood Count 4.15 


 


Hemoglobin 12.0 


 


Hematocrit 34.9 


 


Mean Corpuscular Volume 84.1 


 


Mean Corpuscular Hemoglobin 28.9 


 


Mean Corpuscular Hemoglobin


Concent 34.4 


 


 


Red Cell Distribution Width 16.7 


 


Platelet Count 155 


 


Mean Platelet Volume 7.1 


 


Neutrophils (%) (Auto) 78.3 


 


Lymphocytes (%) (Auto) 8.8 


 


Monocytes (%) (Auto) 11.0 


 


Eosinophils (%) (Auto) 1.3 


 


Basophils (%) (Auto) 0.6 


 


Neutrophils # (Auto) 4.8 


 


Lymphocytes # (Auto) 0.5 


 


Monocytes # (Auto) 0.7 


 


Eosinophils # (Auto) 0.1 


 


Basophils # (Auto) 0.0 


 


CBC Comment AUTO DIFF 


 


Differential Comment


  AUTO DIFF


CONFIRMED


 


Platelet Estimate LOW 


 


Platelet Morphology Comment NORMAL 


 


Ovalocytes 1+ 


 


Blood Urea Nitrogen 14 


 


Creatinine 0.57 


 


Random Glucose 71 


 


Calcium Level 8.4 


 


Sodium Level 139 


 


Potassium Level 4.2 


 


Chloride Level 100 


 


Carbon Dioxide Level 29.6 


 


Anion Gap 9 


 


Estimat Glomerular Filtration


Rate 139 


 








Cardiovascular:  Regular


Lungs:  Clear


Abdomen:  Other (MELINDA in place with mild drainage; J tube in place; JOANNE with SS 

drainage )


Extremities:  No edema


Narrative Exam


Rausch in place





A/P


Assessment and Plan


78 year old male POD5 subtotal gastrectomy 





-Lab work from yesterday good


-NGT out; clear liquids


-PCA for pain; decrease IVF


-Lovenox 


-OOB


-IS 


-DC Rausch





Attending Statement


The exam, history, and the medical decision-making described in the above note 

were completed with the assistance of the mid-level provider. I reviewed and 

agree with the findings presented.  I attest that I had a face-to-face 

encounter with the patient on the same day, and personally performed and 

documented my assessment and findings in the medical record.





stable postop, no signs of complications





DC NG and start sips of clears





increase tube feeds via j-tube slowly





OOB with PT











Florinda MontoyaP/First Assist ARNP May 22, 2018 10:47


Timmy Duvall MD May 23, 2018 12:19

## 2018-05-23 VITALS
HEART RATE: 84 BPM | RESPIRATION RATE: 16 BRPM | DIASTOLIC BLOOD PRESSURE: 81 MMHG | SYSTOLIC BLOOD PRESSURE: 153 MMHG | TEMPERATURE: 98.4 F | OXYGEN SATURATION: 95 %

## 2018-05-23 VITALS
OXYGEN SATURATION: 95 % | HEART RATE: 88 BPM | TEMPERATURE: 98.8 F | DIASTOLIC BLOOD PRESSURE: 66 MMHG | RESPIRATION RATE: 15 BRPM | SYSTOLIC BLOOD PRESSURE: 142 MMHG

## 2018-05-23 VITALS
RESPIRATION RATE: 15 BRPM | HEART RATE: 79 BPM | TEMPERATURE: 98.6 F | DIASTOLIC BLOOD PRESSURE: 72 MMHG | SYSTOLIC BLOOD PRESSURE: 150 MMHG | OXYGEN SATURATION: 96 %

## 2018-05-23 VITALS
DIASTOLIC BLOOD PRESSURE: 81 MMHG | TEMPERATURE: 98 F | OXYGEN SATURATION: 95 % | HEART RATE: 92 BPM | SYSTOLIC BLOOD PRESSURE: 150 MMHG | RESPIRATION RATE: 20 BRPM

## 2018-05-23 VITALS
DIASTOLIC BLOOD PRESSURE: 72 MMHG | RESPIRATION RATE: 18 BRPM | OXYGEN SATURATION: 95 % | HEART RATE: 98 BPM | SYSTOLIC BLOOD PRESSURE: 165 MMHG | TEMPERATURE: 97.9 F

## 2018-05-23 VITALS
TEMPERATURE: 98 F | HEART RATE: 78 BPM | SYSTOLIC BLOOD PRESSURE: 143 MMHG | RESPIRATION RATE: 20 BRPM | OXYGEN SATURATION: 97 % | DIASTOLIC BLOOD PRESSURE: 67 MMHG

## 2018-05-23 RX ADMIN — Medication SCH ML: at 20:59

## 2018-05-23 RX ADMIN — ENOXAPARIN SODIUM SCH MG: 40 INJECTION SUBCUTANEOUS at 14:59

## 2018-05-23 RX ADMIN — OXYTOCIN SCH MLS/HR: 10 INJECTION, SOLUTION INTRAMUSCULAR; INTRAVENOUS at 09:27

## 2018-05-23 RX ADMIN — Medication SCH ML: at 09:00

## 2018-05-23 NOTE — HHI.PR
__________________________________________________





Subjective


Subjective Notes


Up to chair


Already walked with PT in the hallways this morning





Objective


Vitals/I&O





Vital Signs








  Date Time  Temp Pulse Resp B/P (MAP) Pulse Ox O2 Delivery O2 Flow Rate FiO2


 


5/23/18 08:16 98.6 79 15 150/72 (98) 96   


 


5/19/18 20:45      Room Air  


 


5/19/18 11:53        21








Cardiovascular:  Regular


Lungs:  Clear


Abdomen:  Other (MELINDA in place; JOANNE with SS drainage; J tube in place with TF )


Extremities:  No edema


Narrative Exam


Rausch out





A/P


Assessment and Plan


78 year old male POD6 subtotal gastrectomy 





-Increase TF to 30 cc/hr


-Clear liquids--- 6 oz every 4 hours 


-Decrease IVF 


-PCA for pain; decrease IVF


-Lovenox 


-OOB


-IS 


-Plan to advance to full liquids tomorrow---depending on how well the patient 

is able to tolerate full liquids will depend on if the patient will go home on 

TF





Attending Statement


The exam, history, and the medical decision-making described in the above note 

were completed with the assistance of the mid-level provider. I reviewed and 

agree with the findings presented.  I attest that I had a face-to-face 

encounter with the patient on the same day, and personally performed and 

documented my assessment and findings in the medical record.





stable postop, no signs of complications





increase diet to fulls next 24h if continues to tolerate clears





increase tube feeds via j-tube slowly





OOB with PT





possible DC this weekend











Florinda Montoya/First Assist ARNP May 23, 2018 10:13


Timmy Duvall MD May 23, 2018 12:21

## 2018-05-24 VITALS
TEMPERATURE: 98.2 F | DIASTOLIC BLOOD PRESSURE: 65 MMHG | RESPIRATION RATE: 17 BRPM | SYSTOLIC BLOOD PRESSURE: 132 MMHG | OXYGEN SATURATION: 97 % | HEART RATE: 82 BPM

## 2018-05-24 VITALS
RESPIRATION RATE: 17 BRPM | OXYGEN SATURATION: 90 % | DIASTOLIC BLOOD PRESSURE: 73 MMHG | HEART RATE: 90 BPM | TEMPERATURE: 98.1 F | SYSTOLIC BLOOD PRESSURE: 160 MMHG

## 2018-05-24 VITALS
OXYGEN SATURATION: 95 % | TEMPERATURE: 97.7 F | RESPIRATION RATE: 18 BRPM | HEART RATE: 94 BPM | DIASTOLIC BLOOD PRESSURE: 74 MMHG | SYSTOLIC BLOOD PRESSURE: 156 MMHG

## 2018-05-24 VITALS
RESPIRATION RATE: 17 BRPM | OXYGEN SATURATION: 91 % | HEART RATE: 88 BPM | TEMPERATURE: 99.5 F | SYSTOLIC BLOOD PRESSURE: 117 MMHG | DIASTOLIC BLOOD PRESSURE: 78 MMHG

## 2018-05-24 VITALS
TEMPERATURE: 98 F | RESPIRATION RATE: 17 BRPM | HEART RATE: 89 BPM | SYSTOLIC BLOOD PRESSURE: 144 MMHG | OXYGEN SATURATION: 98 % | DIASTOLIC BLOOD PRESSURE: 74 MMHG

## 2018-05-24 RX ADMIN — OXYTOCIN SCH MLS/HR: 10 INJECTION, SOLUTION INTRAMUSCULAR; INTRAVENOUS at 05:28

## 2018-05-24 RX ADMIN — ENOXAPARIN SODIUM SCH MG: 40 INJECTION SUBCUTANEOUS at 13:02

## 2018-05-24 RX ADMIN — Medication SCH ML: at 07:52

## 2018-05-24 RX ADMIN — Medication SCH ML: at 21:26

## 2018-05-24 NOTE — HHI.PR
__________________________________________________





Subjective


Subjective Notes


Up to chair 


Walked earlier today and going to walk again today





Objective


Vitals/I&O





Vital Signs








  Date Time  Temp Pulse Resp B/P (MAP) Pulse Ox O2 Delivery O2 Flow Rate FiO2


 


5/24/18 08:00 98.1 90 17 160/73 (102) 90   








Cardiovascular:  Regular


Lungs:  Clear


Abdomen:  Other (MELINDA in place; J tube with TF; JOANNE with SS drainage )


Extremities:  No edema


Narrative Exam


Rausch out





A/P


Assessment and Plan


78 year old male POD7 subtotal gastrectomy 





-Continue TF to 30 cc/hr


-Advance to full liquids  


-Norco for pain control 


-Lovenox 


-OOB


-IS





Attending Statement


The exam, history, and the medical decision-making described in the above note 

were completed with the assistance of the mid-level provider. I reviewed and 

agree with the findings presented.  I attest that I had a face-to-face 

encounter with the patient on the same day, and personally performed and 

documented my assessment and findings in the medical record.





s/p subtotal gastrectomy and RNY





stable





tolerating PO





OOB





mino j-tube feeds











Florinda MontoyaP/First Assist ARNP May 24, 2018 11:34


Timmy Duvall MD May 25, 2018 13:47

## 2018-05-25 VITALS
OXYGEN SATURATION: 98 % | SYSTOLIC BLOOD PRESSURE: 148 MMHG | HEART RATE: 86 BPM | RESPIRATION RATE: 18 BRPM | DIASTOLIC BLOOD PRESSURE: 76 MMHG | TEMPERATURE: 99 F

## 2018-05-25 VITALS
RESPIRATION RATE: 18 BRPM | HEART RATE: 71 BPM | TEMPERATURE: 98.3 F | DIASTOLIC BLOOD PRESSURE: 70 MMHG | SYSTOLIC BLOOD PRESSURE: 134 MMHG | OXYGEN SATURATION: 96 %

## 2018-05-25 VITALS
DIASTOLIC BLOOD PRESSURE: 63 MMHG | TEMPERATURE: 98.4 F | RESPIRATION RATE: 18 BRPM | SYSTOLIC BLOOD PRESSURE: 148 MMHG | HEART RATE: 72 BPM | OXYGEN SATURATION: 98 %

## 2018-05-25 VITALS
HEART RATE: 84 BPM | OXYGEN SATURATION: 98 % | SYSTOLIC BLOOD PRESSURE: 158 MMHG | RESPIRATION RATE: 18 BRPM | DIASTOLIC BLOOD PRESSURE: 68 MMHG | TEMPERATURE: 98.2 F

## 2018-05-25 VITALS
DIASTOLIC BLOOD PRESSURE: 68 MMHG | SYSTOLIC BLOOD PRESSURE: 124 MMHG | OXYGEN SATURATION: 97 % | RESPIRATION RATE: 17 BRPM | TEMPERATURE: 97.8 F | HEART RATE: 76 BPM

## 2018-05-25 RX ADMIN — Medication SCH ML: at 08:40

## 2018-05-25 RX ADMIN — OXYTOCIN SCH MLS/HR: 10 INJECTION, SOLUTION INTRAMUSCULAR; INTRAVENOUS at 08:40

## 2018-05-25 RX ADMIN — Medication SCH ML: at 20:15

## 2018-05-25 RX ADMIN — ENOXAPARIN SODIUM SCH MG: 40 INJECTION SUBCUTANEOUS at 14:30

## 2018-05-25 NOTE — HHI.PR
__________________________________________________





Subjective


Subjective Notes


feels well, eating some fulls





Objective


Vitals/I&O





Vital Signs








  Date Time  Temp Pulse Resp B/P (MAP) Pulse Ox O2 Delivery O2 Flow Rate FiO2


 


5/25/18 12:00 99.0 86 18 148/76 (100) 98   








Cardiovascular:  Regular


Lungs:  Clear


Abdomen:  Non-distended, Post-op tenderness


Extremities:  No edema, Perfused


Narrative Exam


wound c/d/i





A/P


Assessment and Plan


77yo male s/p subtotal gastrectomy, stable.





tolerating PO, fulls


on supplement night-time feeds





if tolerates acquit PO calories on full liquids can DC home on full liquids 

over weekend without tube feeds











Timmy Duvall MD May 25, 2018 14:20

## 2018-05-26 VITALS
SYSTOLIC BLOOD PRESSURE: 131 MMHG | OXYGEN SATURATION: 98 % | TEMPERATURE: 98 F | DIASTOLIC BLOOD PRESSURE: 60 MMHG | HEART RATE: 70 BPM | RESPIRATION RATE: 17 BRPM

## 2018-05-26 VITALS
TEMPERATURE: 98 F | OXYGEN SATURATION: 97 % | DIASTOLIC BLOOD PRESSURE: 68 MMHG | SYSTOLIC BLOOD PRESSURE: 148 MMHG | RESPIRATION RATE: 18 BRPM | HEART RATE: 83 BPM

## 2018-05-26 VITALS
TEMPERATURE: 98.2 F | OXYGEN SATURATION: 99 % | SYSTOLIC BLOOD PRESSURE: 157 MMHG | RESPIRATION RATE: 16 BRPM | HEART RATE: 75 BPM | DIASTOLIC BLOOD PRESSURE: 74 MMHG

## 2018-05-26 VITALS
HEART RATE: 91 BPM | SYSTOLIC BLOOD PRESSURE: 136 MMHG | DIASTOLIC BLOOD PRESSURE: 70 MMHG | OXYGEN SATURATION: 96 % | TEMPERATURE: 98.1 F | RESPIRATION RATE: 18 BRPM

## 2018-05-26 VITALS
OXYGEN SATURATION: 97 % | HEART RATE: 77 BPM | SYSTOLIC BLOOD PRESSURE: 142 MMHG | DIASTOLIC BLOOD PRESSURE: 66 MMHG | TEMPERATURE: 98 F | RESPIRATION RATE: 18 BRPM

## 2018-05-26 RX ADMIN — Medication SCH ML: at 08:26

## 2018-05-26 RX ADMIN — OXYTOCIN SCH MLS/HR: 10 INJECTION, SOLUTION INTRAMUSCULAR; INTRAVENOUS at 08:26

## 2018-05-26 RX ADMIN — Medication SCH ML: at 20:12

## 2018-05-26 RX ADMIN — ENOXAPARIN SODIUM SCH MG: 40 INJECTION SUBCUTANEOUS at 13:50

## 2018-05-26 NOTE — HHI.PR
__________________________________________________





Subjective


Subjective Notes


Tolerated full liquid breakfast


States he feels constipated; wants something for this





Objective


Vitals/I&O





Vital Signs








  Date Time  Temp Pulse Resp B/P (MAP) Pulse Ox O2 Delivery O2 Flow Rate FiO2


 


5/26/18 16:00 98.2 75 16 157/74 (101) 99   








Lungs:  Clear


Abdomen:  Non-distended, Non-tender


Narrative Exam


Feeding tube site clean


Staples intact without erythema





A/P


Assessment and Plan


POD #9 subtotal gastrectomy





On night time tube feeds


Calorie count started; if tolerating adequate intake, may go home without 

feedings


Will have nursing instruct family on tube feeding in case he needs it still.





Otherwise doing well


Will advance to post gastrectomy diet (three small meals and three snacks)











Kenny Ledezma MD May 26, 2018 18:12

## 2018-05-27 VITALS
DIASTOLIC BLOOD PRESSURE: 67 MMHG | SYSTOLIC BLOOD PRESSURE: 145 MMHG | OXYGEN SATURATION: 96 % | RESPIRATION RATE: 16 BRPM | TEMPERATURE: 98.3 F | HEART RATE: 82 BPM

## 2018-05-27 VITALS
SYSTOLIC BLOOD PRESSURE: 161 MMHG | OXYGEN SATURATION: 100 % | TEMPERATURE: 97.7 F | DIASTOLIC BLOOD PRESSURE: 80 MMHG | HEART RATE: 94 BPM | RESPIRATION RATE: 17 BRPM

## 2018-05-27 VITALS
OXYGEN SATURATION: 97 % | DIASTOLIC BLOOD PRESSURE: 68 MMHG | SYSTOLIC BLOOD PRESSURE: 141 MMHG | TEMPERATURE: 98.2 F | HEART RATE: 74 BPM | RESPIRATION RATE: 18 BRPM

## 2018-05-27 VITALS
DIASTOLIC BLOOD PRESSURE: 67 MMHG | RESPIRATION RATE: 19 BRPM | OXYGEN SATURATION: 99 % | SYSTOLIC BLOOD PRESSURE: 155 MMHG | HEART RATE: 82 BPM | TEMPERATURE: 98 F

## 2018-05-27 VITALS
HEART RATE: 75 BPM | SYSTOLIC BLOOD PRESSURE: 156 MMHG | OXYGEN SATURATION: 97 % | TEMPERATURE: 98.2 F | RESPIRATION RATE: 20 BRPM | DIASTOLIC BLOOD PRESSURE: 72 MMHG

## 2018-05-27 RX ADMIN — ENOXAPARIN SODIUM SCH MG: 40 INJECTION SUBCUTANEOUS at 14:25

## 2018-05-27 RX ADMIN — Medication SCH ML: at 08:41

## 2018-05-27 RX ADMIN — Medication SCH ML: at 23:09

## 2018-05-27 NOTE — HHI.PR
__________________________________________________





Subjective


Subjective Notes


Feels fine, tolerating po, walking around,  denies pain.  bowels working.





Objective


Vitals/I&O





Vital Signs








  Date Time  Temp Pulse Resp B/P (MAP) Pulse Ox O2 Delivery O2 Flow Rate FiO2


 


5/27/18 08:00 98.2 75 20 156/72 (100) 97   








Abdomen:  Non-distended, Non-tender, BS normal


Narrative Exam


g tube intact, wound clean/dry, staples intact.  drain site healed.





A/P


Assessment and Plan


s/p gastrectomy


plan DC tomorrow 


will await calorie count to see if he need tube feeds


will add ensure.











Ronan Kendrick MD May 27, 2018 10:57

## 2018-05-28 VITALS
DIASTOLIC BLOOD PRESSURE: 68 MMHG | HEART RATE: 78 BPM | RESPIRATION RATE: 17 BRPM | SYSTOLIC BLOOD PRESSURE: 146 MMHG | OXYGEN SATURATION: 98 % | TEMPERATURE: 98 F

## 2018-05-28 VITALS
RESPIRATION RATE: 16 BRPM | HEART RATE: 73 BPM | SYSTOLIC BLOOD PRESSURE: 132 MMHG | DIASTOLIC BLOOD PRESSURE: 65 MMHG | TEMPERATURE: 97.9 F | OXYGEN SATURATION: 100 %

## 2018-05-28 RX ADMIN — Medication SCH ML: at 08:16

## 2018-05-28 NOTE — HHI.PR
__________________________________________________





Subjective


Subjective Notes


eating well





Objective


Vitals/I&O





Vital Signs








  Date Time  Temp Pulse Resp B/P (MAP) Pulse Ox O2 Delivery O2 Flow Rate FiO2


 


5/28/18 08:00 98.0 78 17 146/68 (94) 98   








Abdomen:  Non-distended, Non-tender


Extremities:  No edema


Narrative Exam


wound c/d/i





A/P


Assessment and Plan


77yo male s/p subtotal gastrectomy, stable.





tolerating PO, fulls well


ok to Dc home











Timmy Duvall MD May 28, 2018 10:29

## 2023-09-05 NOTE — HHI.PR
Patient has an apt on 9/19/23 but is asking if dr will increase the dosage of his viagra prior to his visit please advise   __________________________________________________





Subjective


Subjective Notes


feels well, no new c/o, pain better, no BM





Objective


Vitals/I&O





Vital Signs








  Date Time  Temp Pulse Resp B/P (MAP) Pulse Ox O2 Delivery O2 Flow Rate FiO2


 


5/19/18 08:00 98.5 85 16 141/63 (89) 95   


 


5/18/18 08:33        21


 


5/18/18 07:00      Nasal Cannula 2.00 








Cardiovascular:  Regular


Lungs:  Clear


Abdomen:  Non-distended, Post-op tenderness


Extremities:  No edema, Perfused


Narrative Exam


JOANNE SS





A/P


Assessment and Plan


79yo male POD#2 subtotal gastrectomy, stable.





- stable postop, OOB, pain controlled


- keep NG to decompress gastro-J anastomosis, await bowel function











Timmy Duvall MD May 19, 2018 12:21